# Patient Record
Sex: FEMALE | URBAN - METROPOLITAN AREA
[De-identification: names, ages, dates, MRNs, and addresses within clinical notes are randomized per-mention and may not be internally consistent; named-entity substitution may affect disease eponyms.]

---

## 2021-12-22 ENCOUNTER — OFFICE VISIT (OUTPATIENT)
Dept: ORTHOPEDICS | Facility: CLINIC | Age: 46
End: 2021-12-22

## 2021-12-22 VITALS — WEIGHT: 150 LBS | BODY MASS INDEX: 24.11 KG/M2 | HEIGHT: 66 IN

## 2021-12-22 DIAGNOSIS — M25.531 RIGHT WRIST PAIN: Primary | ICD-10-CM

## 2021-12-22 DIAGNOSIS — S62.101A CLOSED FRACTURE OF RIGHT WRIST, INITIAL ENCOUNTER: Primary | ICD-10-CM

## 2021-12-22 PROCEDURE — 99204 OFFICE O/P NEW MOD 45 MIN: CPT | Mod: 25 | Performed by: FAMILY MEDICINE

## 2021-12-22 PROCEDURE — T1013 SIGN LANG/ORAL INTERPRETER: HCPCS | Mod: U4

## 2021-12-22 PROCEDURE — 29125 APPL SHORT ARM SPLINT STATIC: CPT | Mod: RT | Performed by: FAMILY MEDICINE

## 2021-12-22 RX ORDER — HYDROCODONE BITARTRATE AND ACETAMINOPHEN 5; 325 MG/1; MG/1
1 TABLET ORAL EVERY 6 HOURS PRN
Qty: 10 TABLET | Refills: 0 | Status: SHIPPED | OUTPATIENT
Start: 2021-12-22 | End: 2021-12-25

## 2021-12-22 ASSESSMENT — MIFFLIN-ST. JEOR: SCORE: 1347.15

## 2021-12-22 NOTE — LETTER
"  12/22/2021      RE: Tonia Luo  [address]  [facility] [address]         [facility]  SPORTS & ORTHOPEDIC CLINIC VISIT     Dec 22, 2021        SUBJECTIVE  Tonia Luo is a/an 46 year old female who is seen as a self referral for evaluation of right wrist pain after a fall on Monday 12/20/21. Patient seen at  clinic at [facility] on 12/20/21 where XR was obtained.  She has significant swelling of the hand, this is painful.  She is having difficulty sleeping.  This is her dominant arm.    Onset: 2 day(s) ago. Patient describes injury as slipped on ice in work parking lot and landed on an outstretched hand.  Location of Pain: right wrist  Worsened by: pronation and supination  Better with: Rest  Treatments tried: rest/activity avoidance  Associated symptoms: swelling, tingling, burning, bruising and pain.    Orthopedic/Surgical history: NO  Social History/Occupation: cleaning and food packing.      REVIEW OF SYSTEMS:    Do you have fever, chills, weight loss? No    Do you have any vision problems? No    Do you have any chest pain or edema? No    Do you have any shortness of breath or wheezing?  No    Do you have stomach problems? No    Do you have any numbness or focal weakness? No    Do you have diabetes? No    Do you have problems with bleeding or clotting? Yes, she is on an anticoagulant.     Do you have an rashes or other skin lesions? No    OBJECTIVE:  Ht 1.676 m (5' 6\")   Wt 68 kg (150 lb)   BMI 24.21 kg/m     General  - normal appearance, in no obvious distress  Musculoskeletal -right wrist  - inspection: Moderately swollen throughout the wrist and hand, as well as fingers  - palpation: Generally tender  - ROM: Globally restricted  - strength: 5/5  strength  Neuro  - no sensory or motor deficit, grossly normal coordination, normal muscle tone        ASSESSMENT & PLAN  Ms. Annalee Luo is a 46 year old woman " here with a right wrist fracture.    I reviewed her x-rays in the room with her, these do reveal a minimally displaced distal radius fracture with mild dorsal angulation.    We did put her into a splint today, she should follow-up next week with a repeat x-ray.  At that visit we could also consider putting her into a cast, if her swelling has improved.    I did prescribe her narcotic for nighttime use, as sleeping is very difficult for her.    It was a pleasure seeing Rick Morgan DO  [facility] SPORTS MEDICINE [facility]    Cast/splint application    Date/Time: 12/22/2021 3:43 PM  Performed by: Tony Morgan DO  Authorized by: Tony Morgan DO     Consent:     Consent obtained:  Verbal    Consent given by:  Patient    Risks discussed:  Discoloration, numbness, pain and swelling    Alternatives discussed:  No treatment  Pre-procedure details:     Sensation:  Normal  Procedure details:     Laterality:  Right    Location:  Wrist    Wrist:  R wrist    Strapping: no      Splint type:  Short arm (static)    Supplies:  Fiberglass  Post-procedure details:     Pain:  Improved    Sensation:  Normal    Patient tolerance of procedure:  Tolerated well, no immediate complications    Patient provided with cast or splint care instructions: Yes        Tony Morgan DO

## 2021-12-22 NOTE — PATIENT INSTRUCTIONS
Tonia yamil were seen today for a right wrist fracture.  Given the amount of swelling that is present you are placed into a short arm splint today.  You should keep the splint on at all times, not move the wrist, it is okay to move the fingers.  You were given pain medicine that is okay to take at nighttime for sleep, as needed.  It is also okay to use the cream for your fingers.  You should follow-up in 1 week, at that visit we would do a repeat x-ray and likely put your arm into a cast, as long as you are swelling is improved.  I anticipate that this may take 4 to 6 weeks to completely heal.    It was a pleasure meeting you.    Bakari Morgan, DO CAQSM

## 2021-12-23 ENCOUNTER — APPOINTMENT (OUTPATIENT)
Dept: INTERPRETER SERVICES | Facility: CLINIC | Age: 46
End: 2021-12-23

## 2021-12-28 DIAGNOSIS — S62.101A CLOSED FRACTURE OF RIGHT WRIST, INITIAL ENCOUNTER: Primary | ICD-10-CM

## 2021-12-30 ENCOUNTER — ANCILLARY PROCEDURE (OUTPATIENT)
Dept: GENERAL RADIOLOGY | Facility: CLINIC | Age: 46
End: 2021-12-30
Attending: FAMILY MEDICINE

## 2021-12-30 ENCOUNTER — OFFICE VISIT (OUTPATIENT)
Dept: ORTHOPEDICS | Facility: CLINIC | Age: 46
End: 2021-12-30

## 2021-12-30 DIAGNOSIS — S62.101D CLOSED FRACTURE OF RIGHT WRIST WITH ROUTINE HEALING, SUBSEQUENT ENCOUNTER: Primary | ICD-10-CM

## 2021-12-30 PROCEDURE — T1013 SIGN LANG/ORAL INTERPRETER: HCPCS | Mod: U4

## 2021-12-30 PROCEDURE — 73110 X-RAY EXAM OF WRIST: CPT | Mod: RT | Performed by: RADIOLOGY

## 2021-12-30 PROCEDURE — 99213 OFFICE O/P EST LOW 20 MIN: CPT | Performed by: FAMILY MEDICINE

## 2021-12-30 NOTE — LETTER
12/30/2021      RE: Tonia Luo  [address]  [facility] [address]         [facility] CLINICS AND SURGERY CENTER  SPORTS & ORTHOPEDIC CLINIC VISIT     Dec 30, 2021        ASSESSMENT & PLAN    45 yo 10 days status post right distal radius Fracture that has been stable for past week in hospitals    Reviewed imaging and assessment with patient in detail  Given degree of comminution and dorsal angulation as well as intraarticualr nature of fx, will recommend that she follow up with hand surgery next week  Did not place cast today due to large amount of swelling still present. Recommended continued icing and elevation  If continued nonoperative treatment deemed appropriate we can cast and continue to follow her in our clinic.     Jesu Bolton MD  [facility] SPORTS MEDICINE CLINIC [place]    -----  Chief Complaint   Patient presents with     Right Wrist - Pain       SUBJECTIVE  Tonia Luo is a/an 46 year old female who is seen for follow up of right displaced distal radius fracture with mild dorsal angulation.  She feels like it is moving more during the last week. Still a significant amount of swelling.    The patient is seen with a .    Date of injury: 12/20/21  Date of Last Visit: 12/22/21 By Dr. Bakari Morgan    Symptoms: improved  Worsened by: Movement of the hand, gripping   Better with: Sling and splint   Treatment to date: ice, other medications: Hydrocodone/Acetaminophen (Vicodin/Norco) and casting/splinting/bracing  Associated symptoms: swelling        REVIEW OF SYSTEMS:    See HPI     OBJECTIVE:  There were no vitals taken for this visit.     General: Alert, pleasant, no distress  Right wrist: warm, well perfused, SILT throughout     Inspection: significant swelling over the dorsal hand with resolving ecchymosis.      ROM:not tested     Strength:intact in median, ulnar, radial nerve distribution     Palpation:ttp over distal radius. No ttp over  distal ulna. No ttp of scaphoid     Special Tests: none      RADIOLOGY:    3 view xrays of right wrist performed and reviewed independently demonstrating impacted intraarticular distal rasiud fracture with dorsal comminution and dorsal aungulation. See EMR for formal radiology report.       Jesu Bolton MD

## 2021-12-30 NOTE — PROGRESS NOTES
[facility] CLINICS AND SURGERY CENTER  SPORTS & ORTHOPEDIC CLINIC VISIT     Dec 30, 2021        ASSESSMENT & PLAN    45 yo 10 days status post right distal radius Fracture that has been stable for past week in splitn    Reviewed imaging and assessment with patient in detail  Given degree of comminution and dorsal angulation as well as intraarticualr nature of fx, will recommend that she follow up with hand surgery next week  Did not place cast today due to large amount of swelling still present. Recommended continued icing and elevation  If continued nonoperative treatment deemed appropriate we can cast and continue to follow her in our clinic.     Jesu Bolton MD  [facility] SPORTS MEDICINE [facility]    -----  Chief Complaint   Patient presents with     Right Wrist - Pain       SUBJECTIVE  Tonia Luo is a/an 46 year old female who is seen for follow up of right displaced distal radius fracture with mild dorsal angulation.  She feels like it is moving more during the last week. Still a significant amount of swelling.    The patient is seen with a .    Date of injury: 12/20/21  Date of Last Visit: 12/22/21 By Dr. Bakari Morgan    Symptoms: improved  Worsened by: Movement of the hand, gripping   Better with: Sling and splint   Treatment to date: ice, other medications: Hydrocodone/Acetaminophen (Vicodin/Norco) and casting/splinting/bracing  Associated symptoms: swelling        REVIEW OF SYSTEMS:    See HPI     OBJECTIVE:  There were no vitals taken for this visit.     General: Alert, pleasant, no distress  Right wrist: warm, well perfused, SILT throughout     Inspection: significant swelling over the dorsal hand with resolving ecchymosis.      ROM:not tested     Strength:intact in median, ulnar, radial nerve distribution     Palpation:ttp over distal radius. No ttp over distal ulna. No ttp of scaphoid     Special Tests: none      RADIOLOGY:    3 view xrays of right  wrist performed and reviewed independently demonstrating impacted intraarticular distal rasiud fracture with dorsal comminution and dorsal aungulation. See EMR for formal radiology report.

## 2022-01-03 ENCOUNTER — LAB (OUTPATIENT)
Dept: LAB | Facility: CLINIC | Age: 47
End: 2022-01-03

## 2022-01-03 ENCOUNTER — OFFICE VISIT (OUTPATIENT)
Dept: ORTHOPEDICS | Facility: CLINIC | Age: 47
End: 2022-01-03

## 2022-01-03 ENCOUNTER — ANCILLARY PROCEDURE (OUTPATIENT)
Dept: GENERAL RADIOLOGY | Facility: CLINIC | Age: 47
End: 2022-01-03
Attending: STUDENT IN AN ORGANIZED HEALTH CARE EDUCATION/TRAINING PROGRAM

## 2022-01-03 VITALS — HEIGHT: 64 IN | WEIGHT: 180 LBS | BODY MASS INDEX: 30.73 KG/M2

## 2022-01-03 DIAGNOSIS — S52.571A OTHER CLOSED INTRA-ARTICULAR FRACTURE OF DISTAL END OF RIGHT RADIUS, INITIAL ENCOUNTER: ICD-10-CM

## 2022-01-03 DIAGNOSIS — S52.501A CLOSED FRACTURE OF RIGHT DISTAL RADIUS: Primary | ICD-10-CM

## 2022-01-03 DIAGNOSIS — S52.501A CLOSED FRACTURE OF RIGHT DISTAL RADIUS: ICD-10-CM

## 2022-01-03 DIAGNOSIS — Z48.89 ENCOUNTER FOR POST SURGICAL WOUND CHECK: Primary | ICD-10-CM

## 2022-01-03 DIAGNOSIS — S52.509A DISTAL RADIUS FRACTURE: ICD-10-CM

## 2022-01-03 PROCEDURE — 99000 SPECIMEN HANDLING OFFICE-LAB: CPT | Performed by: PATHOLOGY

## 2022-01-03 PROCEDURE — 99204 OFFICE O/P NEW MOD 45 MIN: CPT | Performed by: STUDENT IN AN ORGANIZED HEALTH CARE EDUCATION/TRAINING PROGRAM

## 2022-01-03 PROCEDURE — U0003 INFECTIOUS AGENT DETECTION BY NUCLEIC ACID (DNA OR RNA); SEVERE ACUTE RESPIRATORY SYNDROME CORONAVIRUS 2 (SARS-COV-2) (CORONAVIRUS DISEASE [COVID-19]), AMPLIFIED PROBE TECHNIQUE, MAKING USE OF HIGH THROUGHPUT TECHNOLOGIES AS DESCRIBED BY CMS-2020-01-R: HCPCS | Mod: 90 | Performed by: PATHOLOGY

## 2022-01-03 PROCEDURE — 73110 X-RAY EXAM OF WRIST: CPT | Mod: RT | Performed by: RADIOLOGY

## 2022-01-03 PROCEDURE — U0005 INFEC AGEN DETEC AMPLI PROBE: HCPCS | Mod: 90 | Performed by: PATHOLOGY

## 2022-01-03 ASSESSMENT — MIFFLIN-ST. JEOR: SCORE: 1451.47

## 2022-01-03 NOTE — PHARMACY - PREOPERATIVE ASSESSMENT CENTER
Anticoagulation Note - Preoperative Assessment Center (PAC) Pharmacist     Patient was interviewed on January 3, 2022 as a part of PAC clinic appointment. The purpose of this note is to document the perioperative anticoagulation plan outlined by the providers caring for Tonia Luo.     Current Regimen  Anticoagulation Regimen as of January 3, 2022: enoxaparin (Lovenox) 80 mg subcutaneous q12 hours  Indication: h/o DVT/PE in 2018  Prescriber:  Unknown - tried to call pharmacy that patient said she filled it at but no record of them filling it.    Expected Duration of therapy: unknown - no records on file that we can review.   Current medications that may interact with this include: none - no other medications.   Renal function: No labs     Perioperative plan  Tonia Luo is scheduled for OPEN REDUCTION INTERNAL FIXATION, FRACTURE, WRIST on 1/6/22 with Dr. Lambert and the perioperative anticoagulation plan outlined by PAC staff is to hold lovenox x24 hr pre op (last dose 1/5/22 AM).  Plan to check INR AM of surgery as well as  used the word 'Coumadin' during interview with PAC EDWIGE, though patient later denied taking any coumadin and just on the lovnox 2 shots per day.  States these are prescribed through the [facility] (unable to find contact information for this clinic).        Resumption of anticoagulation after procedure will be based on surgery team assessment of bleeding risks and complications.  This plan may require re-assessment and modification by her primary team in the perioperative setting depending on patients clinical situation.    Jeffery Jennings RPH  January 3, 2022  2:57 PM

## 2022-01-03 NOTE — TELEPHONE ENCOUNTER
FUTURE VISIT INFORMATION      SURGERY INFORMATION:    Pre-op H&P,  needed- Surgery 1-6-22, Dr Piyush Lambert, ORIF Rt distal radius fx    Consult: ov 1/3    RECORDS REQUESTED FROM:       Primary Care Provider: Madeleine Vogel MD- [facility]

## 2022-01-03 NOTE — PROGRESS NOTES
"Orthopaedic Surgery Hand and Upper Extremity Clinic H&P NOTE:  1/3/2022     Patient Name: Tonia Luo  MRN: [id number]    CHIEF COMPLAINT: Right distal radius fx    Dominant Hand: Right  Occupation: House cleaning services      HPI:  Ms. Tonia Luo is a 46 year old female right hand dominant who was referred to me by my colleague Dr. Bolton for a consult.  The patient sustained a right distal radius fracture 2 weeks ago when she sustained a mechanical ground-level fall onto her right wrist after slipping on ice.  She was seen in urgent care, no reduction was attempted.  She has been in a splint since that time.  Her pain is now better controlled.  She has no tenderness or tingling in the fingers.  Her fingers and hand remain somewhat swollen. She does not smoke.      PAST MEDICAL HISTORY:  No past medical history on file.    PAST SURGICAL HISTORY:  No past surgical history on file.    MEDICATIONS:  Current Outpatient Medications   Medication     enoxaparin ANTICOAGULANT (LOVENOX) 80 MG/0.8ML syringe     No current facility-administered medications for this visit.       ALLERGIES:   No Known Allergies    FAMILY HISTORY:  No pertinent family history    SOCIAL HISTORY:  Social History     Tobacco Use     Smoking status: Not on file     Smokeless tobacco: Not on file   Substance Use Topics     Alcohol use: Not on file     Drug use: Not on file             The patient's past medical, family, and social history was reviewed and confirmed.    REVIEW OF SYMPTOMS:      General: Negative   Eyes: Negative   Ear, Nose and Throat: Negative   Respiratory: Negative   Cardiovascular: Negative   Gastrointestinal: Negative   Genito-urinary: Negative   Musculoskeletal: Negative  Neurological: Negative   Psychological: Negative  HEME: Negative   ENDO: Negative   SKIN: Negative    VITALS:  Vitals:    01/03/22 1013   Weight: 81.6 kg (180 lb)   Height: 1.626 m (5' 4\")       EXAM:  General: NAD, A&Ox3  HEENT: " NC/AT  CV: RRR by peripheral pulse  Pulmonary: Non-labored breathing on RA  RUE:   Splint removed, skin intact, mild yellow staining of the fingers and hands from a cream that she has been using for pain control  Appropriate tender to palpation at the distal radius  Range of motion deferred  Intact EPL, FPL, hand intrinsics  Sensation intact to light touch median, radial, ulnar nerves  Warm well-perfused, capillary refill less than 2 seconds      IMAGING:  X-rays of the right wrist obtained today reviewed by me demonstrates an intra-articular right distal radius fracture with 25 degrees of dorsal angulation and dorsal comminution. Joint congruity relatively well preserved.    I have personally reviewed the above images and labs.         IMPRESSION AND RECOMMENDATIONS:  Ms. Tonia Luo is a 46 year old year old female right hand dominant with right distal radius fracture.    Visit was conducted with the help of a [place]uran telephone .    Given her young age and functional status and the current dorsal angulation, I have recommended surgery.  I offered her closed reduction and percutaneous pinning versus open reduction internal fixation.  Risks benefits of both were explained in detail with the patient.  The patient would like to get moving as quickly as possible and thus elected to proceed with open reduction internal fixation.    The patient understands that the risks of surgery include, but are not limited to: infection, bleeding, injury to nearby structures (such as nerves, blood vessels, and tendons), nonunion, malunion, hardware failure/irritation or breakage, need for additional surgery, pain, stiffness, scarring, need for rehabilitation, blood clots, pulmonary embolism, stroke, arrhythmia, myocardial infarction, death, and anesthetic complications.  Patient expressed understanding and elected to proceed with surgery. All questions were answered to her satisfaction.    We will plan for  surgery on Thursday, January 6 as she is already 2 weeks out.    Piyush Lambert MD    Hand, Upper Extremity & Microvascular Surgery  Department of Orthopaedic Surgery  [facility]

## 2022-01-03 NOTE — NURSING NOTE
Swedish speaking only.  Patient is alone, used language services with I pad for interpreting/teaching.    Patient spouse can read English and help her with the folder at home also.  Swedish handout provided along with the normal folder.   assisted in explaining each page.    Teaching Flowsheet   Relevant Diagnosis: Right distal radius fracture  Teaching Topic: ORIF Right distal radius.  Urgent surgery needed.  MD would recommend doing surgery this week on 1-6-22.     Patient does not have insurance.  Financial Counseling informed.    [facility] under MAC with [facility] anesthesia with Dr Piyush Lambert.    PAC visit scheduled for tomorrow at 7:30 AM (video visit) and Covid test obtained today after clinic appointment in [facility] lab.    Person(s) involved in teaching:   Patient and .    Motivation Level:  Asks Questions: Yes  Eager to Learn: Yes  Cooperative: Yes  Receptive (willing/able to accept information): Yes  Any cultural factors/Jew beliefs that may influence understanding or compliance? No    Patient demonstrates understanding of the following:  Reason for the appointment, diagnosis and treatment plan: Yes  Knowledge of proper use of medications and conditions for which they are ordered (with special attention to potential side effects or drug interactions): Yes  Which situations necessitate calling provider and whom to contact: Yes    Teaching Concerns Addressed:   Proper use and care of  (medical equip, care aids, etc.): Yes  Nutritional needs and diet plan: Yes  Pain management techniques: Yes  Wound Care: Yes  How and/when to access community resources: Yes     Instructional Materials Used/Given:   Preoperative teaching packet, antibacterial soap.  Frieda Matthews RN

## 2022-01-03 NOTE — LETTER
1/3/2022         RE: Tonia Luo  [address]  [facility] [address]        Dear Colleague,    Thank you for referring your patient, Tonia Luo, to the [facility] [place]. Please see a copy of my visit note below.    Orthopaedic Surgery Hand and Upper Extremity Clinic H&P NOTE:  1/3/2022     Patient Name: Tonia Luo  MRN: [id number]    CHIEF COMPLAINT: Right distal radius fx    Dominant Hand: Right  Occupation: House cleaning services      HPI:  Ms. Tonia Luo is a 46 year old female right hand dominant who was referred to me by my colleague Dr. Bolton for a consult.  The patient sustained a right distal radius fracture 2 weeks ago when she sustained a mechanical ground-level fall onto her right wrist after slipping on ice.  She was seen in urgent care, no reduction was attempted.  She has been in a splint since that time.  Her pain is now better controlled.  She has no tenderness or tingling in the fingers.  Her fingers and hand remain somewhat swollen. She does not smoke.      PAST MEDICAL HISTORY:  No past medical history on file.    PAST SURGICAL HISTORY:  No past surgical history on file.    MEDICATIONS:  Current Outpatient Medications   Medication     enoxaparin ANTICOAGULANT (LOVENOX) 80 MG/0.8ML syringe     No current facility-administered medications for this visit.       ALLERGIES:   No Known Allergies    FAMILY HISTORY:  No pertinent family history    SOCIAL HISTORY:  Social History     Tobacco Use     Smoking status: Not on file     Smokeless tobacco: Not on file   Substance Use Topics     Alcohol use: Not on file     Drug use: Not on file             The patient's past medical, family, and social history was reviewed and confirmed.    REVIEW OF SYMPTOMS:      General: Negative   Eyes: Negative   Ear, Nose and Throat: Negative   Respiratory: Negative   Cardiovascular: Negative   Gastrointestinal:  "Negative   Genito-urinary: Negative   Musculoskeletal: Negative  Neurological: Negative   Psychological: Negative  HEME: Negative   ENDO: Negative   SKIN: Negative    VITALS:  Vitals:    01/03/22 1013   Weight: 81.6 kg (180 lb)   Height: 1.626 m (5' 4\")       EXAM:  General: NAD, A&Ox3  HEENT: NC/AT  CV: RRR by peripheral pulse  Pulmonary: Non-labored breathing on RA  RUE:   Splint removed, skin intact, mild yellow staining of the fingers and hands from a cream that she has been using for pain control  Appropriate tender to palpation at the distal radius  Range of motion deferred  Intact EPL, FPL, hand intrinsics  Sensation intact to light touch median, radial, ulnar nerves  Warm well-perfused, capillary refill less than 2 seconds      IMAGING:  X-rays of the right wrist obtained today reviewed by me demonstrates an intra-articular right distal radius fracture with 25 degrees of dorsal angulation and dorsal comminution. Joint congruity relatively well preserved.    I have personally reviewed the above images and labs.         IMPRESSION AND RECOMMENDATIONS:  Ms. Tonia Luo is a 46 year old year old female right hand dominant with right distal radius fracture.    Visit was conducted with the help of a Namibian telephone .    Given her young age and functional status and the current dorsal angulation, I have recommended surgery.  I offered her closed reduction and percutaneous pinning versus open reduction internal fixation.  Risks benefits of both were explained in detail with the patient.  The patient would like to get moving as quickly as possible and thus elected to proceed with open reduction internal fixation.    The patient understands that the risks of surgery include, but are not limited to: infection, bleeding, injury to nearby structures (such as nerves, blood vessels, and tendons), nonunion, malunion, hardware failure/irritation or breakage, need for additional surgery, pain, " stiffness, scarring, need for rehabilitation, blood clots, pulmonary embolism, stroke, arrhythmia, myocardial infarction, death, and anesthetic complications.  Patient expressed understanding and elected to proceed with surgery. All questions were answered to her satisfaction.    We will plan for surgery on Thursday, January 6 as she is already 2 weeks out.    Piyush Lambert MD    Hand, Upper Extremity & Microvascular Surgery  Department of Orthopaedic Surgery  [facility]

## 2022-01-03 NOTE — PROGRESS NOTES
Preoperative Assessment Center Medication History Note    Medication history completed on January 3, 2022 by this writer. See Epic admission navigator for prior to admission medications. Operating room staff will still need to confirm medications and last dose information on day of surgery.     Medication history interview sources  Patient interview: Yes  Care Everywhere records: No  Surescripts pharmacy refill records: No  Other (if applicable): utilized .     Changes made to PTA medication list  Added: none  Deleted: none   Changed: sig on lovenox    Additional medication history information (including reliability of information, actions taken by pharmacist):    -- Patient states she fills her lovenox at the [address] [place] [address] pharmacy, I called pharmacy and no record of that on file.  Unclear where she is filling the lovenox, but insists she is taking it and has been for the past 3 years since a PE event in 2018.  WE have little to no record of anything in our system or care everywere to decipher whether this is correct, but patient read the dose directly to me and stated it was enoxaparin or lovenox and she uses it twice daily and this was the only medication she was taking  -- No recent (within 30 days) course of antibiotics  -- Patient declines being on any other prescription or over-the-counter medications    Prior to Admission medications    Medication Sig Last Dose Taking? Auth Provider   enoxaparin ANTICOAGULANT (LOVENOX) 80 MG/0.8ML syringe Inject 80 mg Subcutaneous every 12 hours  Taking Yes Reported, Patient          Medication history completed by: Jeffery Jennings [facility]

## 2022-01-03 NOTE — PROGRESS NOTES
Reason For Visit:   Chief Complaint   Patient presents with     Consult     Right wrist injury 12/19/21       Primary MD: Madeleine Vogel      ?  Yes, specify language: Macedonian    Age: 46 year old    Occupation N/A.  Currently working? No.  Work status?  ***.  Date of injury: 12/19/21  Type of injury: ***.  Date of surgery: ***  Type of surgery: ***.  Smoker: No  Request smoking cessation information: No      There were no vitals taken for this visit.      Pain Assessment  Patient Currently in Pain: Yes  0-10 Pain Scale: 2               QuickDASH Assessment  No flowsheet data found.       No Known Allergies    Aissatou Lopes, ATC

## 2022-01-04 ENCOUNTER — PRE VISIT (OUTPATIENT)
Dept: SURGERY | Facility: CLINIC | Age: 47
End: 2022-01-04

## 2022-01-04 ENCOUNTER — VIRTUAL VISIT (OUTPATIENT)
Dept: SURGERY | Facility: CLINIC | Age: 47
End: 2022-01-04

## 2022-01-04 ENCOUNTER — ANESTHESIA EVENT (OUTPATIENT)
Dept: SURGERY | Facility: AMBULATORY SURGERY CENTER | Age: 47
End: 2022-01-04

## 2022-01-04 DIAGNOSIS — Z01.818 PREOP EXAMINATION: Primary | ICD-10-CM

## 2022-01-04 LAB — SARS-COV-2 RNA RESP QL NAA+PROBE: NEGATIVE

## 2022-01-04 PROCEDURE — 99203 OFFICE O/P NEW LOW 30 MIN: CPT | Mod: 95 | Performed by: PHYSICIAN ASSISTANT

## 2022-01-04 ASSESSMENT — PAIN SCALES - GENERAL: PAINLEVEL: NO PAIN (0)

## 2022-01-04 ASSESSMENT — LIFESTYLE VARIABLES: TOBACCO_USE: 0

## 2022-01-04 ASSESSMENT — ENCOUNTER SYMPTOMS: SEIZURES: 0

## 2022-01-04 NOTE — PATIENT INSTRUCTIONS
Preparing for Your Surgery      Name:  Tonia Luo   MRN:  [id number]   :  1975   Today's Date:  2022         Arriving for surgery:  Surgery date:  22  Arrival time:  No surgery time scheduled at the time. You will receive a call from Pre-Admissions with arrival time.    Restrictions due to COVID 19:  One consistent visitor is allowed per patient  No ill visitors  All visitors must wear face mask     parking is available for anyone with mobility limitations or disabilities. (Monday- Friday 7 am- 5 pm)    Please come to:    [facility]  [address] [place] [id number]    Please check in on the 5th floor at the Ambulatory Surgery Center       What can I eat or drink? Pre-Admissions will give you exact times.    -  You may eat and drink normally until 8 hours before surgery.  -  You may have clear liquids up to 4 hours before surgery.   Examples of clear liquids:  Water  Clear broth  Juices (apple, white grape, white cranberry  and cider) without pulp  Noncarbonated, powder based beverages  (lemonade and Calvin-Aid)  Sodas (Sprite, 7-Up, ginger ale and seltzer)  Coffee or tea (without milk or cream)  Gatorade    --No alcohol for at least 24 hours before surgery    Which medicines can I take?    Hold Aspirin for 7 days before surgery.   Hold Multivitamins for 7 days before surgery.  Hold Supplements for 7 days before surgery.  Hold Ibuprofen (Advil, Motrin) for 1 day before surgery--unless otherwise directed by surgeon.  Hold Naproxen (Aleve) for 4 days before surgery.    Hold Enoxaparin (Lovenox) for 12 hours prior to surgery. Hold the morning Lovenox dose the morning of surgery.      -  PLEASE TAKE the following medications the day of surgery   Acetaminophen (Tylenol) if needed    How do I prepare myself?  - Please take 2 showers before surgery using Scrubcare or Hibiclens soap.    Use this soap only from the neck to your toes.     Leave the soap  on your skin for one minute--then rinse thoroughly.      You may use your own shampoo and conditioner; no other hair products.   - Please remove all jewelry and body piercings.  - No lotions, deodorants or fragrance.  - No makeup or fingernail polish.   - Bring your ID and insurance card.    -If you have a Deep Brain Stimulator, a Spinal Cord Stimulator or any implanted Neuro Device you must bring the remote to the Surgery Center         - All patients are required to have a Covid-19 test within 4 days of surgery/procedure.      -Patients will be contacted by the [facility] scheduling team within 1 week of surgery to make an appointment.      - Patients may call the Scheduling team at [phone] if they have not been scheduled within 4 days of  surgery.      ALL PATIENTS ARE REQUIRED TO HAVE A RESPONSIBLE ADULT TO DRIVE AND BE IN ATTENDANCE WITH THEM FOR 24 HOURS FOLLOWING SURGERY       Questions or Concerns:    -For questions regarding the day of surgery please contact the Ambulatory Surgery Center at [phone].    -If you have health changes between today and your surgery please contact your surgeon.     For questions after surgery please call your surgeons office.

## 2022-01-04 NOTE — H&P
Pre-Operative H & P     CC:  Preoperative exam to assess for increased cardiopulmonary risk while undergoing surgery and anesthesia.    Date of Encounter: 1/4/2022  Primary Care Physician:  Madeleine Vogel     Reason for Visit: Distal radius fracture    Phone-Visit Details    Type of service:  Phone Visit    Patient verbally consented to video service today: YES    Phone Start Time: 0728  Phone End Time (time video stopped): 0745    Originating Location (pt. Location): Home    Distant Location (provider location):  [facility] PREOPERATIVE ASSESSMENT [facility]     Mode of Communication:  Video Conference via Doximity      HPI  Tonia Luo is a 47 y/o female who presents for pre-operative H&P in preparation for OPEN REDUCTION INTERNAL FIXATION, FRACTURE, WRIST with Piyush Lambert MD on 1/6/22 at [facility] and Surgery Center for treatment of Distal radius fracture.     Ms. Annalee Luo recently sustained a right distal radius fracture 2 weeks ago when she sustained a mechanical ground-level fall onto her right wrist after slipping on ice.  She was seen in urgent care, no reduction was attempted.  She has been in a splint since that time.  She now presents for the above procedure.    PMH is also significant for hx DVT/PE in 2018 and obesity.    History was obtained from patient via  & chart review.       Hx of abnormal bleeding or anti-platelet use: on lovenox    Menstrual history: No LMP recorded. Patient is premenopausal.:      Prior to Admission Medications  Current Outpatient Medications   Medication Sig Dispense Refill     enoxaparin ANTICOAGULANT (LOVENOX) 80 MG/0.8ML syringe Inject 80 mg Subcutaneous every 12 hours          Family History  Family History   Problem Relation Age of Onset     Anesthesia Reaction No family hx of      Cardiovascular No family hx of      Deep Vein Thrombosis (DVT) No family hx of        The complete review of systems is negative other than  noted in the HPI or here.       Anesthesia Pre-Procedure Evaluation    Patient: Tonia Luo   MRN: [id number] : 1975        Preoperative Diagnosis: Distal radius fracture [S52.509A]    Procedure : Procedure(s):  OPEN REDUCTION INTERNAL FIXATION, FRACTURE, WRIST  Video Visit       No past medical history on file.   Past Surgical History:   Procedure Laterality Date     ABDOMINAL WALL SURGERY  2018    tummy tuck      No Known Allergies   Social History     Tobacco Use     Smoking status: Never Smoker     Smokeless tobacco: Never Used   Substance Use Topics     Alcohol use: Not on file      Wt Readings from Last 1 Encounters:   22 81.6 kg (180 lb)            ROS/MED HX  The complete review of systems is negative other than noted in the HPI or here.  Patient denies recent illness, fever and respiratory infection during past month.  Pt denies steroid use during past year.    ENT/Pulmonary:  - neg pulmonary ROS  (-) tobacco use, asthma and sleep apnea   Neurologic:  - neg neurologic ROS  (-) no seizures and no CVA   Cardiovascular:    Neg cardiovasular   METS/Exercise Tolerance: >4 METS    Hematologic: Comments: Hx PE, on lovenox (prescribed at [facility])          (+) History of blood clots, pt is anticoagulated,  (-) history of blood transfusion   Musculoskeletal: Comment: Acute right distal radius fracture        GI/Hepatic:  - neg GI/hepatic ROS  (-) GERD and liver disease   Renal/Genitourinary:  - neg Renal ROS  (-) renal disease   Endo:     (+) Obesity,  (-) Type II DM   Psychiatric/Substance Use:  - neg psychiatric ROS     Infectious Disease:  - neg infectious disease ROS     Malignancy:  - neg malignancy ROS     Other:  - neg other ROS              PAC Discussion and Assessment    ASA Classification: 2  Case is suitable for: ASC        Virtual visit -  No vitals were obtained       Physical Exam  Constitutional: Awake, alert, cooperative, no apparent distress, and appears stated  age.  Neurologic: Awake, alert, oriented to name, place and time.   Neuropsychiatric: Calm, cooperative. Normal affect. Answers questions appropriately.    ** Patient's visit was done virtually today.  A full physical exam was not completed.  Please refer to the physical examination documented by the anesthesiologist in the anesthesia record on the day of surgery. **        PRIOR LABS/DIAGNOSTIC STUDIES:   All labs and imaging personally reviewed     3 views right wrist radiographs 1/3/2022  Impression:  Redemonstrated dorsally angulated comminuted fracture of the right  distal radius, overall similar in appearance and alignment to prior.  Splinting material place.    The patient's records and results personally reviewed by this provider.       LABS/DIAGNOSTIC STUDIES to be collected on DOS:  INR, BMP, Hgb      COVID19 testing completed 1/3/22 - result pending      ASSESSMENT and PLAN  Tonia Luo is a 45 y/o female who presents for pre-operative H&P in preparation for OPEN REDUCTION INTERNAL FIXATION, FRACTURE, WRIST with Piyush Lambret MD on 1/6/22 at [facility] and Surgery Center for treatment of Distal radius fracture    Pt has had prior anesthetic.  No history of anesthetic complications.      She has the following specific operative considerations:   # DAYLIN 1/8 = low risk  # VTE risk: 1.8%  # Risk of PONV score = 3.  If > 2, anti-emetic intervention recommended.  # Anesthesia considerations:  Refer to PAC assessment in anesthesia records    # Increased risk of postoperative nausea/vomiting: Recommend use of antiemetic agents in the perioperative period.        CARDIAC: METS >4      # RCRI : No serious cardiac risks.  0.4% risk of major adverse cardiac event.       PULMONARY:     # Never smoked    # Denies asthma or inhaler use    GI:     # Denies GERD    ENDO: BMI 31    # No DM    HEME:     # On lovenox bid, will hold 12 hrs prior (prescribed at [facility] per pt). Will check INR on DOS.    #  Hx PE one year ago, per pt    ORTHO:     # Acute right distal radius fracture        Patient is optimized and is acceptable candidate for the proposed procedure. No further diagnostic evaluation is needed.      ** Patient's visit was done virtually today.  A full physical exam was not completed.  Please refer to the physical examination documented by the anesthesiologist in the anesthesia record on the day of surgery. **    Final plan per anesthesiologist on day of surgery.     Arrival time, NPO, shower and medication instructions provided by nursing staff today.  Preparing For Your Surgery handout given.        On the day of service:     Prep time: 12 minutes  Visit time: 17 minutes  Documentation time: 10 minutes  ------------------------------------------  Total time: 39 minutes      Rita Pelletier PA-C  Preoperative Assessment Center  [facility]  Clinic and Surgery Center  Phone: [phone]  Fax: [phone]

## 2022-01-04 NOTE — H&P (VIEW-ONLY)
Pre-Operative H & P     CC:  Preoperative exam to assess for increased cardiopulmonary risk while undergoing surgery and anesthesia.    Date of Encounter: 1/4/2022  Primary Care Physician:  Madeleine Vogel     Reason for Visit: Distal radius fracture    Phone-Visit Details    Type of service:  Phone Visit    Patient verbally consented to video service today: YES    Phone Start Time: 0728  Phone End Time (time video stopped): 0745    Originating Location (pt. Location): Home    Distant Location (provider location):  [facility] PREOPERATIVE ASSESSMENT CENTER     Mode of Communication:  Video Conference via Doximity      HPI  Tonia Luo is a 45 y/o female who presents for pre-operative H&P in preparation for OPEN REDUCTION INTERNAL FIXATION, FRACTURE, WRIST with Piyush Lambert MD on 1/6/22 at [facility] and Surgery Center for treatment of Distal radius fracture.     Ms. Annalee Luo recently sustained a right distal radius fracture 2 weeks ago when she sustained a mechanical ground-level fall onto her right wrist after slipping on ice.  She was seen in urgent care, no reduction was attempted.  She has been in a splint since that time.  She now presents for the above procedure.    PMH is also significant for hx DVT/PE in 2018 and obesity.    History was obtained from patient via  & chart review.       Hx of abnormal bleeding or anti-platelet use: on lovenox    Menstrual history: No LMP recorded. Patient is premenopausal.:      Prior to Admission Medications  Current Outpatient Medications   Medication Sig Dispense Refill     enoxaparin ANTICOAGULANT (LOVENOX) 80 MG/0.8ML syringe Inject 80 mg Subcutaneous every 12 hours          Family History  Family History   Problem Relation Age of Onset     Anesthesia Reaction No family hx of      Cardiovascular No family hx of      Deep Vein Thrombosis (DVT) No family hx of        The complete review of systems is negative other than  noted in the HPI or here.       Anesthesia Pre-Procedure Evaluation    Patient: Tonia Luo   MRN: [id number] : 1975        Preoperative Diagnosis: Distal radius fracture [S52.509A]    Procedure : Procedure(s):  OPEN REDUCTION INTERNAL FIXATION, FRACTURE, WRIST  Video Visit       No past medical history on file.   Past Surgical History:   Procedure Laterality Date     ABDOMINAL WALL SURGERY  2018    tummy tuck      No Known Allergies   Social History     Tobacco Use     Smoking status: Never Smoker     Smokeless tobacco: Never Used   Substance Use Topics     Alcohol use: Not on file      Wt Readings from Last 1 Encounters:   22 81.6 kg (180 lb)            ROS/MED HX  The complete review of systems is negative other than noted in the HPI or here.  Patient denies recent illness, fever and respiratory infection during past month.  Pt denies steroid use during past year.    ENT/Pulmonary:  - neg pulmonary ROS  (-) tobacco use, asthma and sleep apnea   Neurologic:  - neg neurologic ROS  (-) no seizures and no CVA   Cardiovascular:    Neg cardiovasular   METS/Exercise Tolerance: >4 METS    Hematologic: Comments: Hx PE, on lovenox (prescribed at [facility])          (+) History of blood clots, pt is anticoagulated,  (-) history of blood transfusion   Musculoskeletal: Comment: Acute right distal radius fracture        GI/Hepatic:  - neg GI/hepatic ROS  (-) GERD and liver disease   Renal/Genitourinary:  - neg Renal ROS  (-) renal disease   Endo:     (+) Obesity,  (-) Type II DM   Psychiatric/Substance Use:  - neg psychiatric ROS     Infectious Disease:  - neg infectious disease ROS     Malignancy:  - neg malignancy ROS     Other:  - neg other ROS              PAC Discussion and Assessment    ASA Classification: 2  Case is suitable for: ASC        Virtual visit -  No vitals were obtained       Physical Exam  Constitutional: Awake, alert, cooperative, no apparent distress, and appears stated  age.  Neurologic: Awake, alert, oriented to name, place and time.   Neuropsychiatric: Calm, cooperative. Normal affect. Answers questions appropriately.    ** Patient's visit was done virtually today.  A full physical exam was not completed.  Please refer to the physical examination documented by the anesthesiologist in the anesthesia record on the day of surgery. **        PRIOR LABS/DIAGNOSTIC STUDIES:   All labs and imaging personally reviewed     3 views right wrist radiographs 1/3/2022  Impression:  Redemonstrated dorsally angulated comminuted fracture of the right  distal radius, overall similar in appearance and alignment to prior.  Splinting material place.    The patient's records and results personally reviewed by this provider.       LABS/DIAGNOSTIC STUDIES to be collected on DOS:  INR, BMP, Hgb      COVID19 testing completed 1/3/22 - result pending      ASSESSMENT and PLAN  Tonia Luo is a 47 y/o female who presents for pre-operative H&P in preparation for OPEN REDUCTION INTERNAL FIXATION, FRACTURE, WRIST with Piyush Lambert MD on 1/6/22 at [facility] and Surgery Center for treatment of Distal radius fracture    Pt has had prior anesthetic.  No history of anesthetic complications.      She has the following specific operative considerations:   # DAYLIN 1/8 = low risk  # VTE risk: 1.8%  # Risk of PONV score = 3.  If > 2, anti-emetic intervention recommended.  # Anesthesia considerations:  Refer to PAC assessment in anesthesia records    # Increased risk of postoperative nausea/vomiting: Recommend use of antiemetic agents in the perioperative period.        CARDIAC: METS >4      # RCRI : No serious cardiac risks.  0.4% risk of major adverse cardiac event.       PULMONARY:     # Never smoked    # Denies asthma or inhaler use    GI:     # Denies GERD    ENDO: BMI 31    # No DM    HEME:     # On lovenox bid, will hold 12 hrs prior (prescribed at [facility] per pt). Will check INR on DOS.    #  Hx PE one year ago, per pt    ORTHO:     # Acute right distal radius fracture        Patient is optimized and is acceptable candidate for the proposed procedure. No further diagnostic evaluation is needed.      ** Patient's visit was done virtually today.  A full physical exam was not completed.  Please refer to the physical examination documented by the anesthesiologist in the anesthesia record on the day of surgery. **    Final plan per anesthesiologist on day of surgery.     Arrival time, NPO, shower and medication instructions provided by nursing staff today.  Preparing For Your Surgery handout given.        On the day of service:     Prep time: 12 minutes  Visit time: 17 minutes  Documentation time: 10 minutes  ------------------------------------------  Total time: 39 minutes      Rita Pelletier PA-C  Preoperative Assessment Center  [facility]  Clinic and Surgery Center  Phone: [phone]  Fax: [phone]

## 2022-01-04 NOTE — PROGRESS NOTES
Tonia is a 46 year old who is being evaluated via a billable video visit.      How would you like to obtain your AVS? Mail a copy E-mail to [email or web address]  If the video visit is dropped, the invitation should be resent by: Text to cell phone: [phone]     HPI       Review of Systems         Physical Exam

## 2022-01-04 NOTE — ANESTHESIA PREPROCEDURE EVALUATION
Anesthesia Pre-Procedure Evaluation    Patient: Tonia Luo   MRN: [id number] : 1975        Preoperative Diagnosis: Distal radius fracture [S52.509A]    Procedure : Procedure(s):  OPEN REDUCTION INTERNAL FIXATION, FRACTURE, WRIST  Video Visit       No past medical history on file.   Past Surgical History:   Procedure Laterality Date     ABDOMINAL WALL SURGERY      tummy tuck      No Known Allergies   Social History     Tobacco Use     Smoking status: Never Smoker     Smokeless tobacco: Never Used   Substance Use Topics     Alcohol use: Not on file      Wt Readings from Last 1 Encounters:   22 81.6 kg (180 lb)        Anesthesia Evaluation   Pt has had prior anesthetic.     No history of anesthetic complications       ROS/MED HX  ENT/Pulmonary:  - neg pulmonary ROS  (-) tobacco use, asthma and sleep apnea   Neurologic:  - neg neurologic ROS  (-) no seizures and no CVA   Cardiovascular:       METS/Exercise Tolerance: >4 METS    Hematologic: Comments: Hx PE, on lovenox (prescribed at [facility])          (+) History of blood clots, pt is anticoagulated,  (-) history of blood transfusion   Musculoskeletal: Comment: Acute right distal radius fracture        GI/Hepatic:  - neg GI/hepatic ROS  (-) GERD and liver disease   Renal/Genitourinary:  - neg Renal ROS  (-) renal disease   Endo:     (+) Obesity,  (-) Type II DM   Psychiatric/Substance Use:  - neg psychiatric ROS     Infectious Disease:  - neg infectious disease ROS     Malignancy:  - neg malignancy ROS     Other:  - neg other ROS          Physical Exam    Airway  airway exam normal           Respiratory Devices and Support         Dental  no notable dental history         Cardiovascular   cardiovascular exam normal          Pulmonary   pulmonary exam normal                OUTSIDE LABS:  CBC: No results found for: WBC, HGB, HCT, PLT  BMP: No results found for: NA, POTASSIUM, CHLORIDE, CO2, BUN, CR, GLC  COAGS: No results found  for: PTT, INR, FIBR  POC: No results found for: BGM, HCG, HCGS  HEPATIC: No results found for: ALBUMIN, PROTTOTAL, ALT, AST, GGT, ALKPHOS, BILITOTAL, BILIDIRECT, FROYLAN  OTHER: No results found for: PH, LACT, A1C, HANNA, PHOS, MAG, LIPASE, AMYLASE, TSH, T4, T3, CRP, SED    Anesthesia Plan    ASA Status:  2   NPO Status:  NPO Appropriate    Anesthesia Type: MAC.     - Reason for MAC: straight local not clinically adequate   Induction: Intravenous, Propofol.   Maintenance: TIVA.        Consents    Anesthesia Plan(s) and associated risks, benefits, and realistic alternatives discussed. Questions answered and patient/representative(s) expressed understanding.     - Discussed: Risks, Benefits and Alternatives for BOTH SEDATION and the PROCEDURE were discussed     - Discussed with:  Patient      - Extended Intubation/Ventilatory Support Discussed: No.      - Patient is DNR/DNI Status: No    Use of blood products discussed: No .     Postoperative Care    Pain management: IV analgesics, Oral pain medications, Multi-modal analgesia, Peripheral nerve block (Single Shot).   PONV prophylaxis: Background Propofol Infusion     Comments:              PAC Discussion and Assessment    ASA Classification: 2  Case is suitable for: ASC                    PAC Resident/NP Anesthesia Assessment: Tonia Luo is a 47 y/o female who presents for pre-operative H&P in preparation for OPEN REDUCTION INTERNAL FIXATION, FRACTURE, WRIST with Piyush Lambert MD on 1/6/22 at [facility] and Surgery Center for treatment of Distal radius fracture    Pt has had prior anesthetic.  No history of anesthetic complications.      She has the following specific operative considerations:   # DAYLIN 1/8 = low risk  # VTE risk: 1.8%  # Risk of PONV score = 3.  If > 2, anti-emetic intervention recommended.  # Anesthesia considerations:  Refer to PAC assessment in anesthesia records    # Increased risk of postoperative nausea/vomiting: Recommend use of  antiemetic agents in the perioperative period.        CARDIAC: METS >4      # RCRI : No serious cardiac risks.  0.4% risk of major adverse cardiac event.       PULMONARY:     # Never smoked    # Denies asthma or inhaler use    GI:     # Denies GERD    ENDO: BMI 31    # No DM    HEME:     # On lovenox bid, will hold 12 hrs prior (prescribed at [facility] per pt). Will check INR on DOS.    # Hx PE one year ago, per pt    ORTHO:     # Acute right distal radius fracture        Patient is optimized and is acceptable candidate for the proposed procedure. No further diagnostic evaluation is needed.      ** Patient's visit was done virtually today.  A full physical exam was not completed.  Please refer to the physical examination documented by the anesthesiologist in the anesthesia record on the day of surgery. **    Final plan per anesthesiologist on day of surgery.     Reviewed and Signed by PAC Mid-Level Provider/Resident  Mid-Level Provider/Resident: Rita Pelletier PA-C  Date: 1/4/22  Time: 0800                               Rita Pelletier PA-C

## 2022-01-05 ENCOUNTER — APPOINTMENT (OUTPATIENT)
Dept: INTERPRETER SERVICES | Facility: CLINIC | Age: 47
End: 2022-01-05

## 2022-01-06 ENCOUNTER — APPOINTMENT (OUTPATIENT)
Dept: INTERPRETER SERVICES | Facility: CLINIC | Age: 47
End: 2022-01-06

## 2022-01-06 ENCOUNTER — HOSPITAL ENCOUNTER (OUTPATIENT)
Facility: AMBULATORY SURGERY CENTER | Age: 47
End: 2022-01-06
Attending: STUDENT IN AN ORGANIZED HEALTH CARE EDUCATION/TRAINING PROGRAM | Admitting: STUDENT IN AN ORGANIZED HEALTH CARE EDUCATION/TRAINING PROGRAM

## 2022-01-06 ENCOUNTER — ANESTHESIA (OUTPATIENT)
Dept: SURGERY | Facility: AMBULATORY SURGERY CENTER | Age: 47
End: 2022-01-06

## 2022-01-06 VITALS
RESPIRATION RATE: 16 BRPM | HEART RATE: 83 BPM | DIASTOLIC BLOOD PRESSURE: 74 MMHG | HEIGHT: 64 IN | TEMPERATURE: 98.6 F | OXYGEN SATURATION: 97 % | BODY MASS INDEX: 30.73 KG/M2 | SYSTOLIC BLOOD PRESSURE: 97 MMHG | WEIGHT: 180 LBS

## 2022-01-06 DIAGNOSIS — S52.571A OTHER CLOSED INTRA-ARTICULAR FRACTURE OF DISTAL END OF RIGHT RADIUS, INITIAL ENCOUNTER: Primary | ICD-10-CM

## 2022-01-06 LAB
ANION GAP SERPL CALCULATED.3IONS-SCNC: 7 MMOL/L (ref 3–14)
BUN SERPL-MCNC: 15 MG/DL (ref 7–30)
CALCIUM SERPL-MCNC: 8.5 MG/DL (ref 8.5–10.1)
CHLORIDE BLD-SCNC: 109 MMOL/L (ref 94–109)
CO2 SERPL-SCNC: 25 MMOL/L (ref 20–32)
CREAT SERPL-MCNC: 0.65 MG/DL (ref 0.52–1.04)
GFR SERPL CREATININE-BSD FRML MDRD: >90 ML/MIN/1.73M2
GLUCOSE BLD-MCNC: 94 MG/DL (ref 70–99)
HCG UR QL: NEGATIVE
HGB BLD-MCNC: 13.1 G/DL (ref 11.7–15.7)
INR PPP: 1.05 (ref 0.85–1.15)
INTERNAL QC OK POCT: NORMAL
POCT KIT EXPIRATION DATE: NORMAL
POCT KIT LOT NUMBER: NORMAL
POTASSIUM BLD-SCNC: 3.4 MMOL/L (ref 3.4–5.3)
SODIUM SERPL-SCNC: 141 MMOL/L (ref 133–144)

## 2022-01-06 PROCEDURE — 80048 BASIC METABOLIC PNL TOTAL CA: CPT | Performed by: PATHOLOGY

## 2022-01-06 PROCEDURE — C1713 ANCHOR/SCREW BN/BN,TIS/BN: HCPCS

## 2022-01-06 PROCEDURE — 25609 OPTX DST RD XART FX/EP SEP3+: CPT | Mod: RT | Performed by: STUDENT IN AN ORGANIZED HEALTH CARE EDUCATION/TRAINING PROGRAM

## 2022-01-06 PROCEDURE — 81025 URINE PREGNANCY TEST: CPT | Performed by: PATHOLOGY

## 2022-01-06 PROCEDURE — 85018 HEMOGLOBIN: CPT | Performed by: PATHOLOGY

## 2022-01-06 PROCEDURE — 85610 PROTHROMBIN TIME: CPT | Performed by: PATHOLOGY

## 2022-01-06 PROCEDURE — 25609 OPTX DST RD XART FX/EP SEP3+: CPT | Mod: RT

## 2022-01-06 PROCEDURE — 82962 GLUCOSE BLOOD TEST: CPT | Performed by: PATHOLOGY

## 2022-01-06 DEVICE — IMP WIRE KIRSCHNER 1.1MM 0.045X4" SGL END TROCAR KM71-132: Type: IMPLANTABLE DEVICE | Site: WRIST | Status: FUNCTIONAL

## 2022-01-06 DEVICE — IMPLANTABLE DEVICE: Type: IMPLANTABLE DEVICE | Site: WRIST | Status: FUNCTIONAL

## 2022-01-06 DEVICE — IMP WIRE KIRSCHNER 1.6MM 0.062X4" SGL END TROCAR KM71-134: Type: IMPLANTABLE DEVICE | Site: WRIST | Status: FUNCTIONAL

## 2022-01-06 RX ORDER — FENTANYL CITRATE 50 UG/ML
25-50 INJECTION, SOLUTION INTRAMUSCULAR; INTRAVENOUS
Status: DISCONTINUED | OUTPATIENT
Start: 2022-01-06 | End: 2022-01-06 | Stop reason: HOSPADM

## 2022-01-06 RX ORDER — SODIUM CHLORIDE, SODIUM LACTATE, POTASSIUM CHLORIDE, CALCIUM CHLORIDE 600; 310; 30; 20 MG/100ML; MG/100ML; MG/100ML; MG/100ML
INJECTION, SOLUTION INTRAVENOUS CONTINUOUS PRN
Status: DISCONTINUED | OUTPATIENT
Start: 2022-01-06 | End: 2022-01-06

## 2022-01-06 RX ORDER — PROPOFOL 10 MG/ML
INJECTION, EMULSION INTRAVENOUS CONTINUOUS PRN
Status: DISCONTINUED | OUTPATIENT
Start: 2022-01-06 | End: 2022-01-06

## 2022-01-06 RX ORDER — HYDROXYZINE HYDROCHLORIDE 25 MG/1
25 TABLET, FILM COATED ORAL
Status: DISCONTINUED | OUTPATIENT
Start: 2022-01-06 | End: 2022-01-07 | Stop reason: HOSPADM

## 2022-01-06 RX ORDER — BUPIVACAINE HYDROCHLORIDE 2.5 MG/ML
INJECTION, SOLUTION INFILTRATION; PERINEURAL PRN
Status: DISCONTINUED | OUTPATIENT
Start: 2022-01-06 | End: 2022-01-06 | Stop reason: HOSPADM

## 2022-01-06 RX ORDER — ONDANSETRON 4 MG/1
4-8 TABLET, ORALLY DISINTEGRATING ORAL EVERY 8 HOURS PRN
Qty: 4 TABLET | Refills: 0 | Status: SHIPPED | OUTPATIENT
Start: 2022-01-06

## 2022-01-06 RX ORDER — SENNOSIDES 8.6 MG
1 TABLET ORAL DAILY
Qty: 10 TABLET | Refills: 0 | Status: SHIPPED | OUTPATIENT
Start: 2022-01-06

## 2022-01-06 RX ORDER — OXYCODONE HYDROCHLORIDE 5 MG/1
5 TABLET ORAL
Status: DISCONTINUED | OUTPATIENT
Start: 2022-01-06 | End: 2022-01-07 | Stop reason: HOSPADM

## 2022-01-06 RX ORDER — NALOXONE HYDROCHLORIDE 0.4 MG/ML
0.2 INJECTION, SOLUTION INTRAMUSCULAR; INTRAVENOUS; SUBCUTANEOUS
Status: DISCONTINUED | OUTPATIENT
Start: 2022-01-06 | End: 2022-01-07 | Stop reason: HOSPADM

## 2022-01-06 RX ORDER — FLUMAZENIL 0.1 MG/ML
0.2 INJECTION, SOLUTION INTRAVENOUS
Status: DISCONTINUED | OUTPATIENT
Start: 2022-01-06 | End: 2022-01-06 | Stop reason: HOSPADM

## 2022-01-06 RX ORDER — ONDANSETRON 4 MG/1
4 TABLET, ORALLY DISINTEGRATING ORAL
Status: DISCONTINUED | OUTPATIENT
Start: 2022-01-06 | End: 2022-01-07 | Stop reason: HOSPADM

## 2022-01-06 RX ORDER — NALOXONE HYDROCHLORIDE 0.4 MG/ML
0.4 INJECTION, SOLUTION INTRAMUSCULAR; INTRAVENOUS; SUBCUTANEOUS
Status: DISCONTINUED | OUTPATIENT
Start: 2022-01-06 | End: 2022-01-07 | Stop reason: HOSPADM

## 2022-01-06 RX ORDER — IBUPROFEN 200 MG
600 TABLET ORAL
Status: DISCONTINUED | OUTPATIENT
Start: 2022-01-06 | End: 2022-01-07 | Stop reason: HOSPADM

## 2022-01-06 RX ORDER — PROPOFOL 10 MG/ML
INJECTION, EMULSION INTRAVENOUS PRN
Status: DISCONTINUED | OUTPATIENT
Start: 2022-01-06 | End: 2022-01-06

## 2022-01-06 RX ORDER — LIDOCAINE HYDROCHLORIDE 20 MG/ML
INJECTION, SOLUTION INFILTRATION; PERINEURAL PRN
Status: DISCONTINUED | OUTPATIENT
Start: 2022-01-06 | End: 2022-01-06

## 2022-01-06 RX ORDER — CEFAZOLIN SODIUM 2 G/50ML
2 SOLUTION INTRAVENOUS SEE ADMIN INSTRUCTIONS
Status: DISCONTINUED | OUTPATIENT
Start: 2022-01-06 | End: 2022-01-06 | Stop reason: HOSPADM

## 2022-01-06 RX ORDER — OXYCODONE HYDROCHLORIDE 5 MG/1
5 TABLET ORAL EVERY 6 HOURS PRN
Qty: 20 TABLET | Refills: 0 | Status: SHIPPED | OUTPATIENT
Start: 2022-01-06

## 2022-01-06 RX ORDER — BUPIVACAINE HYDROCHLORIDE 5 MG/ML
INJECTION, SOLUTION EPIDURAL; INTRACAUDAL
Status: COMPLETED | OUTPATIENT
Start: 2022-01-06 | End: 2022-01-06

## 2022-01-06 RX ORDER — ACETAMINOPHEN 500 MG
500-1000 TABLET ORAL EVERY 6 HOURS PRN
Qty: 50 TABLET | Refills: 1 | Status: SHIPPED | OUTPATIENT
Start: 2022-01-06

## 2022-01-06 RX ORDER — DOCUSATE SODIUM 100 MG/1
100 CAPSULE, LIQUID FILLED ORAL 2 TIMES DAILY
Qty: 30 CAPSULE | Refills: 1 | Status: SHIPPED | OUTPATIENT
Start: 2022-01-06

## 2022-01-06 RX ORDER — EPHEDRINE SULFATE 50 MG/ML
INJECTION, SOLUTION INTRAMUSCULAR; INTRAVENOUS; SUBCUTANEOUS PRN
Status: DISCONTINUED | OUTPATIENT
Start: 2022-01-06 | End: 2022-01-06

## 2022-01-06 RX ORDER — AMOXICILLIN 250 MG
1-2 CAPSULE ORAL 2 TIMES DAILY
Qty: 30 TABLET | Refills: 0 | Status: SHIPPED | OUTPATIENT
Start: 2022-01-06

## 2022-01-06 RX ORDER — CEFAZOLIN SODIUM 2 G/50ML
2 SOLUTION INTRAVENOUS
Status: COMPLETED | OUTPATIENT
Start: 2022-01-06 | End: 2022-01-06

## 2022-01-06 RX ADMIN — EPHEDRINE SULFATE 10 MG: 50 INJECTION, SOLUTION INTRAMUSCULAR; INTRAVENOUS; SUBCUTANEOUS at 09:48

## 2022-01-06 RX ADMIN — CEFAZOLIN SODIUM 2 G: 2 SOLUTION INTRAVENOUS at 09:37

## 2022-01-06 RX ADMIN — PROPOFOL 100 MCG/KG/MIN: 10 INJECTION, EMULSION INTRAVENOUS at 11:24

## 2022-01-06 RX ADMIN — PROPOFOL 50 MG: 10 INJECTION, EMULSION INTRAVENOUS at 10:38

## 2022-01-06 RX ADMIN — Medication 100 MCG: at 11:09

## 2022-01-06 RX ADMIN — LIDOCAINE HYDROCHLORIDE 60 MG: 20 INJECTION, SOLUTION INFILTRATION; PERINEURAL at 09:33

## 2022-01-06 RX ADMIN — SODIUM CHLORIDE, SODIUM LACTATE, POTASSIUM CHLORIDE, CALCIUM CHLORIDE: 600; 310; 30; 20 INJECTION, SOLUTION INTRAVENOUS at 11:27

## 2022-01-06 RX ADMIN — SODIUM CHLORIDE, SODIUM LACTATE, POTASSIUM CHLORIDE, CALCIUM CHLORIDE: 600; 310; 30; 20 INJECTION, SOLUTION INTRAVENOUS at 09:27

## 2022-01-06 RX ADMIN — PROPOFOL 250 MCG/KG/MIN: 10 INJECTION, EMULSION INTRAVENOUS at 09:33

## 2022-01-06 RX ADMIN — EPHEDRINE SULFATE 10 MG: 50 INJECTION, SOLUTION INTRAMUSCULAR; INTRAVENOUS; SUBCUTANEOUS at 10:15

## 2022-01-06 RX ADMIN — FENTANYL CITRATE 50 MCG: 50 INJECTION, SOLUTION INTRAMUSCULAR; INTRAVENOUS at 09:08

## 2022-01-06 RX ADMIN — EPHEDRINE SULFATE 5 MG: 50 INJECTION, SOLUTION INTRAMUSCULAR; INTRAVENOUS; SUBCUTANEOUS at 09:56

## 2022-01-06 RX ADMIN — BUPIVACAINE HYDROCHLORIDE 20 ML: 5 INJECTION, SOLUTION EPIDURAL; INTRACAUDAL at 09:11

## 2022-01-06 ASSESSMENT — MIFFLIN-ST. JEOR: SCORE: 1451.47

## 2022-01-06 NOTE — ANESTHESIA CARE TRANSFER NOTE
Patient: Tonia Luo    Procedure: Procedure(s):  OPEN REDUCTION INTERNAL FIXATION RIGHT WRIST FRACTURE       Diagnosis: Distal radius fracture [S52.509A]  Diagnosis Additional Information: No value filed.    Anesthesia Type:   No value filed.     Note:    Oropharynx: spontaneously breathing  Level of Consciousness: awake  Oxygen Supplementation: room air    Independent Airway: airway patency satisfactory and stable  Dentition: dentition unchanged  Vital Signs Stable: post-procedure vital signs reviewed and stable  Report to RN Given: handoff report given  Patient transferred to: Phase II  Comments: Resps easy and regular. Report to PACU RN  Handoff Report: Identifed the Patient, Identified the Reponsible Provider, Reviewed the pertinent medical history, Discussed the surgical course, Reviewed Intra-OP anesthesia mangement and issues during anesthesia, Set expectations for post-procedure period and Allowed opportunity for questions and acknowledgement of understanding      Vitals:  Vitals Value Taken Time   /69 01/06/22 1150   Temp 36.1  C (97  F) 01/06/22 1150   Pulse 83 01/06/22 1150   Resp 16 01/06/22 1150   SpO2 96 % 01/06/22 1150       Electronically Signed By: GELY MEYER CRNA  January 6, 2022  11:53 AM

## 2022-01-06 NOTE — ANESTHESIA POSTPROCEDURE EVALUATION
Patient: Tonia Luo    Procedure: Procedure(s):  OPEN REDUCTION INTERNAL FIXATION RIGHT WRIST FRACTURE       Diagnosis:Distal radius fracture [S52.509A]  Diagnosis Additional Information: No value filed.    Anesthesia Type:  MAC, Peripheral Nerve Block    Note:  Disposition: Outpatient   Postop Pain Control: Uneventful            Sign Out: Well controlled pain   PONV: No   Neuro/Psych: Uneventful            Sign Out: Acceptable/Baseline neuro status   Airway/Respiratory: Uneventful            Sign Out: Acceptable/Baseline resp. status   CV/Hemodynamics: Uneventful            Sign Out: Acceptable CV status; No obvious hypovolemia; No obvious fluid overload   Other NRE: NONE   DID A NON-ROUTINE EVENT OCCUR? No           Last vitals:  Vitals Value Taken Time   BP 97/74 01/06/22 1258   Temp 37  C (98.6  F) 01/06/22 1258   Pulse 93 01/06/22 1232   Resp 16 01/06/22 1258   SpO2 97 % 01/06/22 1258   Vitals shown include unvalidated device data.    Electronically Signed By: Fabio Palma MD  January 6, 2022  1:43 PM

## 2022-01-06 NOTE — DISCHARGE INSTRUCTIONS
Procedure Performed: Open reduction internal fixation right distal radius  Attending Surgeon: Piyush Lambert MD  Date: 1/6/2022    DIAGNOSIS  1. Other closed intra-articular fracture of distal end of right radius, initial encounter        MEDICATIONS   1. Resume all home medications as directed unless otherwise instructed during this hospitalization. IF THERE IS ANY QUESTION, double check with your doctors or your primary care provider.  2. Start new discharge medications as directed.    Take 1-2 tablets of extra strength Tylenol 500 mg every 6 hours.     For breakthrough pain use narcotic pain medication as prescribed.    3. Do not drive or operate machinery while taking narcotic pain medications.   4. If you are taking other Tylenol containing medicines at home, be sure NOT to exceed 4 gram's (4000 milligrams) of Tylenol per day.   5. If you are taking pain medications, be sure to take COLACE (docusate sodium) as well to prevent constipation. If constipated, try adding another cathartic or enema.  If nausea and vomiting, call the hospital or seek medical attention.    ACTIVITY   1. Weight bearing: Non-weight bearing to arm.    DIET  1. Resume same diet prior to your hospital admission.    WOUND   1. Leave splint on until you are seen in clinic for your follow up visit.   2. Watch for signs and symptoms of infection of your wounds including; pain, redness, swelling, drainage or fever.  If you notice any of these symptoms please seek medical attention.    3. Keep wound clean, dry and intact.  4. Do not submerge wounds in water until they are healed.     RETURN   1. Follow-up with Orthopaedic Clinic as directed.     ***  Future Appointments   Date Time Provider Department [place]   1/6/2022 11:35 AM VIDEO,  [facility]   1/24/2022 11:45 AM Piyush Lambert MD [facility]   1/24/2022 12:30 PM Ilda Vanessa OT [facility]       2. Call the [facility] at [phone] during business  hours for any symptoms such as:    * Fevers with Temperature greater than 101.5 degrees.   * Pus drainage from wound site.   * Severe pain, not controlled by medication.   * Persistent nausea, vomiting and inablility to tolerate fluids.    If you are receiving care in [place], you may call the Orthopaedic clinic at [phone] Option #2, Option #3.      -FOR URGENT PROBLEMS ONLY, after hours or on weekends call the hospital  at [phone] and ask to speak with the Orthopaedic resident on call.    3. You may call the Orthopaedic Clinic at [phone] with questions or concerns.    4. You may also be seen at our Orthopaedic Walk-In clinic that runs Monday through Friday from 7a-5p and Saturdays from 8a-12p at [address] [place] [id number]. You can call the Walk-In Clinic at [phone]        [facility]  Home Care Following Anesthesia  For 24 hours after surgery:  1. Get plenty of rest.  A responsible adult must stay with you for at least 24 hours after you leave the surgery center.  2. Do not drive or use heavy equipment.  If you have weakness or tingling, don't drive or use heavy equipment until this feeling goes away.   3. Do not drink alcohol.   4. Avoid strenuous or risky activities.  Ask for help when climbing stairs.  5. You may feel lightheaded.  IF so, sit for a few minutes before standing.  Have someone help you get up.   6. If you have nausea (feel sick to your stomach): Drink only clear liquids such as apple juice, ginger ale, broth or 7-Up.  Rest may also help.  Be sure to drink enough fluids.  Move to a regular diet as you feel able.   7. You may have a slight fever.  Call the doctor if your fever is over 100 F (37.7 C) (taken under the tongue) or lasts longer than 24 hours.  8. You may have a dry mouth, a sore throat, muscle aches or trouble sleeping. These should go away after 24 hours.  9. Do not make important or legal decisions.  "  10. It is recommended to avoid smoking.        Today you received an Exparel block to numb the nerves near your surgery site.  This is a block using local anesthetic or \"numbing\" medication injected around the nerves to anesthetize or \"numb\" the area supplied by those nerves.  This block is injected into the muscle layer near your surgical site.  This medication may numb the location where you had surgery up to 72 hours.  If your surgical site is an arm or leg you should be careful with your affected limb, since it is possible to injure your limb without being aware of it due to the numbing.  Until full feeling returns, you should guard against bumping or hitting your limb, and avoid extreme hot or cold temperatures on the skin.  As the block wears off, the feeling will return as a tingling or prickly sensation near your surgical site.  You will experince more discomfort from your incision as the feeling returns.  You may want to take a pain pill (a narcotic or Tylenol if this was prescribed by your surgeon) when you start to experience mild pain before the pain beomes more severe.  If your pain medications do not control your pain, you should notify your surgeon.    Tips for taking pain medications  To get the best pain relief possible, remember these points:    Take pain medications as directed, before pain becomes severe.    Pain medication can upset your stomach: taking it with food may help.    Constipation is a common side effect of pain medication. Drink plenty of  fluids.    Eat foods high in fiber. Take a stool softener if recommended by your doctor or pharmacist.    Do not drink alcohol, drive or operate machinery while taking pain medications.    Ask about other ways to control pain, such as with heat, ice or relaxation.    Tylenol/Acetaminophen Consumption  To help encourage the safe use of acetaminophen, the makers of TYLENOL  have lowered the maximum daily dose for single-ingredient Extra Strength " TYLENOL  (acetaminophen) products sold in the [place] from 8 pills per day (4,000 mg) to 6 pills per day (3,000 mg). The dosing interval has also changed from 2 pills every 4-6 hours to 2 pills every 6 hours.    If you feel your pain relief is insufficient, you may take Tylenol/Acetaminophen in addition to your narcotic pain medication.     Be careful not to exceed 3,000 mg of Tylenol/Acetaminophen in a 24 hour period from all sources.    If you are taking extra strength Tylenol/acetaminophen (500 mg), the maximum dose is 6 tablets in 24 hours.    If you are taking regular strength acetaminophen (325 mg), the maximum dose is 9 tablets in 24 hours.    Call a doctor for any of the followin. Signs of infection (fever, growing tenderness at the surgery site, a large amount of drainage or bleeding, severe pain, foul-smelling drainage, redness, swelling).  2. It has been over 8 to 10 hours since surgery and you are still not able to urinate (pass water).  3. Headache for over 24 hours.  4. Numbness, tingling or weakness the day after surgery (if you had spinal anesthesia).  5. Signs of Covid-19 infection (temperature over 100 degrees, shortness of breath, cough, loss of taste/smell, generalized body aches, persistent headache, chills, sore throat, nausea/vomiting/diarrhea)  Your doctor is:       Dr. Piyush Lambert, Orthopaedics: [phone]               Or dial [phone] and ask for the resident on call for:  Orthopaedics  For emergency care, call the:  [facility] Emergency Department: [phone] (TTY for hearing impaired: [phone])

## 2022-01-06 NOTE — ANESTHESIA PROCEDURE NOTES
Brachial plexus Procedure Note    Pre-Procedure   Staff -        Anesthesiologist:  Fabio Palma MD       Resident/Fellow: Diego Arroyo MD       Performed By: resident       Procedure Start/Stop Times: 1/6/2022 9:05 AM and 1/6/2022 9:11 AM       Pre-Anesthestic Checklist: patient identified, IV checked, site marked, risks and benefits discussed, informed consent, monitors and equipment checked, pre-op evaluation, at physician/surgeon's request and post-op pain management  Timeout:       Correct Patient: Yes        Correct Procedure: Yes        Correct Site: Yes        Correct Position: Yes        Correct Laterality: Yes        Site Marked: Yes  Procedure Documentation  Procedure: Brachial plexus       Laterality: right       Patient Position: sitting       Skin prep: Chloraprep (supraclavicular approach).       Needle Type: insulated       Needle Gauge: 21.        Needle Length (millimeters): 110        Ultrasound guided       1. Ultrasound was used to identify targeted nerve, plexus, vascular marker, or fascial plane and place a needle adjacent to it in real-time.       2. Ultrasound was used to visualize the spread of anesthetic in close proximity to the above referenced structure.       3. A permanent image is entered into the patient's record.    Assessment/Narrative         The placement was negative for: blood aspirated and painful injection       Paresthesias: No.     Bolus given via needle..        Secured via.        Insertion/Infusion Method: Single Shot       Complications: none    Medication(s) Administered   Bupivacaine 0.5% PF (Infiltration), 20 mL  Mepivacaine 2% PF (Perineural), 10 mL  Medication Administration Time: 1/6/2022 9:11 AM

## 2022-01-06 NOTE — OP NOTE
Patient: Tonia Luo  : 1975  MRN: [id number]    DATE OF OPERATION: 2022      OPERATIVE REPORT       PREOPERATIVE DIAGNOSIS:  Right intra-articular distal radius fracture     POSTOPERATIVE DIAGNOSIS:  Right intra-articular distal radius fracture     PROCEDURE:  Open reduction internal fixation right distal radius fracture, 3 fragments    SURGEON:  Piyush Lambert MD     ASSISTANT(S):  Carolina Wesley MD    ANESTHESIA:  Regional + MAC     IMPLANTS:   [employer] narrow distal radius plate    ESTIMATED BLOOD LOSS:  5cc    DVT PROPHYLAXIS:  SCDs    TOURNIQUET TIME:  95 minutes    SPECIMENS REMOVED:  None    INTRAOPERATIVE FINDINGS:  Dorsally displaced distal radius fracture with significant callus given chronicity    COMPLICATIONS:  None    DISPOSITION:  Stable to PACU.     INDICATIONS:  Ms. Tonia Luo is a 46 year old female who sustained a distal radius fracture after a mechanical ground-level fall after slipping on ice. She did not undergo a reduction. She presented to my clinic 2 weeks out from injury. Given the dorsal angulation, I recommended a reduction and either percutaneous pinning or open reduction internal fixation. The patient desired to maximize function in the early recovery period and elected to proceed with open reduction internal fixation.    Indications for surgery were discussed with the patient in detail with the help of a . After discussing risks, benefits and alternatives to procedure, the patient elected to proceed with surgical intervention.     The patient understood that risks include, but are not limited to: infection, bleeding, injury to nearby structures (such as nerves, blood vessels, and tendons), nonunion, malunion, hardware failure/irritation or breakage, need for additional surgery, pain, stiffness, scarring, need for rehabilitation, blood clots, pulmonary embolism, stroke, arrhythmia, myocardial infarction, death, and  anesthetic complications.  Patient expressed understanding and elected to proceed with surgery. All questions were answered to her satisfaction.    Consent was obtained for the procedure.     DESCRIPTION OF PROCEDURE IN DETAIL:  The patient was seen in the preoperative care unit. Patient identity, consent, procedure to be performed, and operative site were verified with the patient. The right upper extremity was marked.     The patient was brought to the operating room and placed supine on the operating room table. The right upper extremity was placed on an armboard. SCDs were placed on the bilateral lower extremities. A well-padded tourniquet was placed on the upper arm.     Monitored sedation was provided by the anesthesia team.     The right upper extremity was prepped and draped in the usual sterile fashion. A timeout was performed confirming the correct patient, procedure, operative site, and antibiotics. All were in agreement.    The right upper extremity was exsanguinated with Esmarch and the tourniquet was inflated to 250mm Hg.  A volar Wing approach was made to the distal radius.  A longitudinal incision over the FCR tendon extending 5cm proximal to the proximal wrist wrist crease was made. Careful hemostasis was obtained with bipolar cautery.    Blunt dissection was taken down to the level of FCR tendon. The volar sheath of the FCR was sharply divided.  The palmar cutaneous branch of the median nerve was identified and protected. The FCR tendon was retracted ulnarly and the radial artery was retracted radially. The dorsal sheath of the FCR was sharply incised revealing the FPL.  This was retracted ulnarly. The pronator quadratus was sharply elevated in an ulnar based flap maintaining its full thickness revealing the radius and fracture below. The fracture was cleaned carefully of callus.  A  reduction was manually obtained and checked under fluoroscopy. To assist reduction, the brachioradialis was  released in step wise fashion.    A volar locking plate was placed on the distal radius and temporarily held with k-wires.  Appropriate fracture reduction and plate placement were visualized under fluoroscopy.  The distal locking screws were drilled, measured and placed.     This was then repeated for the proximal screws. Appropriate fracture reduction and hardware placement was confirmed. Appropriate screw lengths were noted. No screws were within the radiocarpal or distal radioulnar joints. The provisional k-wires were removed. The wrist was taken through a range of motion.  It was noted that there was no crepitus.    The tourniquet was deflated and thorough irrigation was performed.  The pronator quadratus was repaired with 3-0 ethibond in running locking fashion. The brachioradialis was repaired with 3-0 ethibond figure of 8. Deep dermal tissue was repaired using 3-0 vicryl in an interrupted buried fashion.  Skin was closed with 4-0 monocryl in running fashion. Steri-strips were placed and a sterile surgical dressing was applied. The right upper extremity was immobilized in a well-padded short arm splint.    All needle and sponge counts were correct at the end of the procedure. There were no complications.     The patient was awoken from anesthesia and taken to the postoperative recovery unit in stable condition.     I was present and scrubbed for the entire procedure.     POSTOPERATIVE PLAN:  The patient will maintain the splint until the postoperative visit in 2 weeks. The splint will be kept clean and dry. The patient will remain non-weightbearing on the operative extremity. I have discussed and instructed the patient to keep the extremity elevated and work on range of motion exercises of the digits. She will be prescribed 20 tablets of oxycodone 5 mg q6h prn pain. X-rays will be obtained at the first postoperative visit out of plaster. She will see hand therapy at that time for wrist range of motion and a  brace.    Piyush Lambert MD     Hand, Upper Extremity & Microvascular Surgery  Department of Orthopedic Surgery  [facility]

## 2022-01-06 NOTE — OR NURSING
Pt received right sided supraclavicular nerve block without exparel. Pt received 1mg Versed and 50 mcg Fentanyl IV. Pt tolerated procedure without immediate complication. VSS.

## 2022-01-06 NOTE — BRIEF OP NOTE
"[facility]    Brief Operative Note    Pre-operative diagnosis: Distal radius fracture [S52.509A]  Post-operative diagnosis Same as pre-operative diagnosis    Procedure: Procedure(s):  OPEN REDUCTION INTERNAL FIXATION RIGHT WRIST FRACTURE  Surgeon: Surgeon(s) and Role:     * Piyush Lambert MD - Primary     * Carolina Wesley MD - Resident - Assisting     Anesthesia: Combined MAC with Supraclavicular Block   Estimated Blood Loss: Less than 10 ml    Drains: None  Specimens: * No specimens in log *  Findings:   See operative report for details.  Complications: None.  Implants:   Implant Name Type Inv. Item Serial No.  Lot No. LRB No. Used Action   IMP WIRE EVENS 1.1MM 0.045X4\" [facility] TROCAR [place] [id number] - [id number] Wire IMP WIRE EVENS 1.1MM 0.045X4\" [facility] TROCAR [id number]  [employer]  Right 4 Used as a Supply   IMP WIRE EVENS 1.6MM 0.062X4\" [facility] TROCAR [id number] - [id number] Wire IMP WIRE EVENS 1.6MM 0.062X4\" [facility] TROCAR [id number]  [employer]  Right 1 Used as a Supply   2.5 Adaptive I TriLock Distal Radius Plate, Right, Narrow    [employer]  Right 1 Implanted   2.5 Cortical Screw, HexaDrive 7, 13mm    [employer]  Right 1 Implanted   2.5 TriLock Screw, HexaDrive 7, 12mm    [employer]  Right 2 Implanted   2.5 TriLock Screw, HexaDrive 7, 16mm    [employer]  Right 4 Implanted   2.5 Cortical Screw, HexaDrive 7, 14mm    [employer]  Right 1 Implanted   2.5 Cortical Screw, HexaDrive 7, 12mm    [employer]  Right 2 Implanted     Post-Op Plan:  May discharge to home once PACU criteria met  Activity: Up ad fidencio  Weight bearing status: NWB RUE  Wound Care: Right short arm splint remain clean, dry and intact until clinic follow up   Pain management: Tylenol, ibuprofen, oxycodone  DVT prophylaxis: Ambulation  Follow up:    Future Appointments   Date Time Provider Department Center   1/24/2022 11:45 AM Piyush Lambert MD [facility]   1/24/2022 " 12:30 PM Ilda Vanessa, OT [facility]       Kennedi Wesley MD  Orthopaedic Surgery PGY-4

## 2022-01-07 LAB — GLUCOSE BLDC GLUCOMTR-MCNC: 85 MG/DL (ref 70–99)

## 2022-01-19 ENCOUNTER — APPOINTMENT (OUTPATIENT)
Dept: INTERPRETER SERVICES | Facility: CLINIC | Age: 47
End: 2022-01-19

## 2022-01-19 DIAGNOSIS — S52.501A CLOSED FRACTURE OF RIGHT DISTAL RADIUS: Primary | ICD-10-CM

## 2022-01-24 ENCOUNTER — OFFICE VISIT (OUTPATIENT)
Dept: ORTHOPEDICS | Facility: CLINIC | Age: 47
End: 2022-01-24

## 2022-01-24 ENCOUNTER — ANCILLARY PROCEDURE (OUTPATIENT)
Dept: GENERAL RADIOLOGY | Facility: CLINIC | Age: 47
End: 2022-01-24
Attending: STUDENT IN AN ORGANIZED HEALTH CARE EDUCATION/TRAINING PROGRAM

## 2022-01-24 ENCOUNTER — THERAPY VISIT (OUTPATIENT)
Dept: OCCUPATIONAL THERAPY | Facility: CLINIC | Age: 47
End: 2022-01-24

## 2022-01-24 DIAGNOSIS — S52.501A CLOSED FRACTURE OF RIGHT DISTAL RADIUS: ICD-10-CM

## 2022-01-24 DIAGNOSIS — S52.571D OTHER CLOSED INTRA-ARTICULAR FRACTURE OF DISTAL END OF RIGHT RADIUS WITH ROUTINE HEALING, SUBSEQUENT ENCOUNTER: Primary | ICD-10-CM

## 2022-01-24 DIAGNOSIS — M25.531 RIGHT WRIST PAIN: ICD-10-CM

## 2022-01-24 PROCEDURE — 99024 POSTOP FOLLOW-UP VISIT: CPT | Performed by: STUDENT IN AN ORGANIZED HEALTH CARE EDUCATION/TRAINING PROGRAM

## 2022-01-24 PROCEDURE — 97760 ORTHOTIC MGMT&TRAING 1ST ENC: CPT | Mod: GO | Performed by: OCCUPATIONAL THERAPIST

## 2022-01-24 PROCEDURE — 97110 THERAPEUTIC EXERCISES: CPT | Mod: GO | Performed by: OCCUPATIONAL THERAPIST

## 2022-01-24 PROCEDURE — 97165 OT EVAL LOW COMPLEX 30 MIN: CPT | Mod: GO | Performed by: OCCUPATIONAL THERAPIST

## 2022-01-24 PROCEDURE — 73110 X-RAY EXAM OF WRIST: CPT | Mod: RT | Performed by: RADIOLOGY

## 2022-01-24 NOTE — PROGRESS NOTES
"Hand Therapy Initial Evaluation    Current Date:  1/24/2022    Diagnosis: Fracture of distal end of right radius   DOI: ~12/20/21  DOS: 1/6/22  Procedure:  ORIF  Post:  2w 4d  MD notes:   - hand therapy today for zipper brace and to begin ROM  - counseled on importance of finger and wrist ROM  - NWJOE WOODY  - Ok to shower, no baths or soaking  - discontinue sling  F/u 4 weeks with xrays    Precautions: NWB RUE     Subjective:  Tonia Luo is a 46 year old female.    Patient reports symptoms of the right wrist  which occurred due to fall on ice. Since onset symptoms are Gradually getting better.  General health as reported by patient is good.    Pertinent medical history includes:  Past Medical History:   Diagnosis Date     Closed fracture of distal end of right radius 12/2021     Pulmonary embolism (H)        Medical allergies: No Known Allergies.      Surgical history: other:   Past Surgical History:   Procedure Laterality Date     ABDOMINAL WALL SURGERY  2018    \"tummy tuck\"     OPEN REDUCTION INTERNAL FIXATION WRIST Right 1/6/2022    Procedure: OPEN REDUCTION INTERNAL FIXATION RIGHT WRIST FRACTURE;  Surgeon: Piyush Lambert MD;  [place] [facility] [place]ication history:   Current Outpatient Medications   Medication     acetaminophen (TYLENOL) 500 MG tablet     docusate sodium (COLACE) 100 MG capsule     enoxaparin ANTICOAGULANT (LOVENOX) 80 MG/0.8ML syringe     ondansetron (ZOFRAN-ODT) 4 MG ODT tab     oxyCODONE (ROXICODONE) 5 MG tablet     senna-docusate (SENOKOT-S/PERICOLACE) 8.6-50 MG tablet     sennosides (SENOKOT) 8.6 MG tablet     No current facility-administered medications for this visit.     Current occupation is housekeeping, food packaging in company   Job Tasks: Lifting, Carrying, Pushing, Pulling, Repetitive Tasks    Occupational Profile Information:  Right hand dominant  Prior functional level:  no limitations  Patient reports symptoms of pain, stiffness/loss of motion, weakness/loss of " "strength, edema and numbness  Special tests:  x-ray.    Previous treatment: None  Barriers include:none  Mobility: No difficulty  Transportation: drives  Currently not working due to present treatment problem  Leisure activities/hobbies: Cooking, baking cakes     Functional Outcome Measure:   Upper Extremity Functional Index Score:  SCORE:   Column Totals: /80: 28   (A lower score indicates greater disability.)    Objective:  Pain Level (Scale 0-10)   1/24/2022   At Rest 1   With Use 5     Pain Description  Date 1/24/2022   Location Radial wrist and hand    Pain Quality Bump, shooting, sharp (then goes away)    Frequency constant     Pain is worst  nighttime   Exacerbated by  Movement    Relieved by Rest, positioning    Progression Getting better      Sensation    R RF dorsum numb at times.     Scar    Healing, covered in steri strips     Edema (Circumference measured in cm)   1/24/2022 1/24/2022    L R   DWC 16.5 17.5     Moderate edema and mild bruising noted in digits      Shoulder Screen  (Full or Limited)   1/24/2022 1/24/2022    L R   Reach behind head F \"   Reach contralateral shoulder \" \"   Reach low back \" \"   Reach mid back \" \"   Reach scapula \" \"     ROM  Pain Report:  -none + mild    ++ moderate    +++ severe   Elbow 1/24/2022 1/24/2022   AROM (PROM) L R   Extension WFL \"   Flexion \" \"     Wrist 1/24/2022 1/24/2022   AROM (PROM) L R   Extension 65 25+   Flexion 63 25+   RD 30 15   UD 40 20   Supination 85 80   Pronation 80 35     Distance from palm during composite flexion (According to digit O meter)   IF 5  MF 6  RF 5  SF 4    Thumb 1/24/2022 1/24/2022   AROM (PROM) L R   MP 0/55 0/40   IP 0/65 0/10   RABD 55 45   PABD 65 45     Palpation  Pain Report:  -none + mild    ++ moderate    +++ severe    1/24/2022   Radial Styloid -   DRSN -   TFCC -     Strength   (Measured in pounds)  NT d/t precautions     Assessment:  Patient presents with symptoms consistent with diagnosis of distal radius fracture, with " surgical  intervention.     Patient's limitations or Problem List includes:  Pain, Decreased ROM/motion, Increased edema, Weakness, Sensory disturbance, Hypomobility, Decreased , Decreased pinch, Decreased coordination, Decreased dexterity and Tightness in musculature of the right wrist, hand and thumb which interferes with the patient's ability to perform Self Care Tasks (dressing, bathing), Work Tasks, Sleep Patterns, Recreational Activities, Household Chores and Driving  as compared to previous level of function.    Rehab Potential:  Excellent - Return to full activity, no limitations     Patient will benefit from skilled Occupational Therapy to increase ROM, flexibility, motion, overall strength,  strength, pinch strength, stability of wrist, coordination, dexterity and sensation and decrease pain and edema to return to previous activity level and resume normal daily tasks and to reach their rehab potential.    Barriers to Learning:  Language    Communication Issues:  Patient appears to be able to clearly communicate and understand verbal and written communication and follow directions correctly.    Chart Review: Chart Review and Simple history review with patient    Identified Performance Deficits: bathing/showering, dressing, driving and community mobility, health management and maintenance, home establishment and management, meal preparation and cleanup, shopping, sleep, work and leisure activities    Assessment of Occupational Performance:  5 or more Performance Deficits    Clinical Decision Making (Complexity): Low complexity    Treatment Explanation:  The following has been discussed with the patient:  RX ordered/plan of care  Anticipated outcomes  Possible risks and side effects    Plan:  Frequency:  1 X week, once daily  Duration:  for 12 weeks    Treatment Plan:    Modalities:    US, Iontophoresis, Fluidotherapy, Paraffin, TENS and E-Stim   Therapeutic Exercise:    AROM, AAROM, PROM, Tendon  Gliding, Blocking, Reverse Blocking, Place and Hold, Contract Relax, Extensor Tracking, Isotonics, Isometrics and Stabilization  Therapeutic Activities:   Functional activities   Neuromuscular re-ed:   Nerve Gliding, Coordination/Dexterity, Sensory re-education, Proprioceptive Training, Posture, Kinesiotaping, Strain Counter Strain, Isometrics and Stabilization  Manual Techniques:   Coordination/Dexterity, Joint mobilization, Scar mobilization, Friction massage, Myofascial release and Manual edema mobilization  Orthotic Fabrication:    Static, Static progressive and Dynamic  Self Care:    Self Care Tasks, Ergonomic Considerations, Community Transportation and Work Tasks    Discharge Plan:    Achieve all LTG.  Independent in home treatment program.  Reach maximal therapeutic benefit.    Home Program:   R Zipper orthotic     PTRX Report  The following values have been sent to [facility].  Wrist Active Range of Motion Extension and Flexion Combined  EMR Notes  HEP - Sets  Reps 5-10  Sessions per day 2-3  Notes Hang wrist over edge of table  Active Range of Motion Combined Radial and Ulnar Deviation  EMR Notes  HEP - Sets  Reps 5-10  Sessions per day 2-3  Notes  Forearm Active Range of Motion Combined Pronation and Supination  EMR Notes  HEP - Sets  Reps 5-10  Sessions per day 2-3  Notes  Finger Active Range of Motion Tendon Glides Fist Series  EMR Notes  HEP - Sets  Reps 5-10  Sessions per day 2-3  Notes  Intrinsics Active Range of Motion Table Top Bending  EMR Notes  HEP - Sets  Reps 5-10  Sessions per day 2-3  Notes  Thumb Active Range of Motion Composite Flexion  EMR Notes  HEP - Sets  Reps 5-10  Sessions per day 2-3  Notes  Orthosis Wear and Care  EMR Notes  HEP - Sets  Reps  Sessions per day  Notes      Next Visit:  Assess HEP  Assess orthotic   Progress as able

## 2022-01-24 NOTE — NURSING NOTE
Reason For Visit:   Chief Complaint   Patient presents with     RECHECK     DOS 1/6/22 ORIF right distal radius fracture       Primary MD: Madeleine Vogel    Age: 46 year old    ?  No      LMP 01/05/2022 (Exact Date)       Pain Assessment  Patient Currently in Pain: Yes  0-10 Pain Scale: 1  Primary Pain Location: Wrist (Right)               QuickDASH Assessment  No flowsheet data found.       Current Outpatient Medications   Medication Sig Dispense Refill     acetaminophen (TYLENOL) 500 MG tablet Take 1-2 tablets (500-1,000 mg) by mouth every 6 hours as needed for mild pain 50 tablet 1     docusate sodium (COLACE) 100 MG capsule Take 1 capsule (100 mg) by mouth 2 times daily 30 capsule 1     enoxaparin ANTICOAGULANT (LOVENOX) 80 MG/0.8ML syringe Inject 80 mg Subcutaneous every 12 hours        ondansetron (ZOFRAN-ODT) 4 MG ODT tab Take 1-2 tablets (4-8 mg) by mouth every 8 hours as needed for nausea 4 tablet 0     oxyCODONE (ROXICODONE) 5 MG tablet Take 1 tablet (5 mg) by mouth every 6 hours as needed for severe pain 20 tablet 0     senna-docusate (SENOKOT-S/PERICOLACE) 8.6-50 MG tablet Take 1-2 tablets by mouth 2 times daily 30 tablet 0     sennosides (SENOKOT) 8.6 MG tablet Take 1 tablet by mouth daily 10 tablet 0       No Known Allergies    Jyoti Perez, ATC

## 2022-01-24 NOTE — LETTER
2022         RE: Tonia Luo  [address]  [place] [facility] [address]        Dear Colleague,    Thank you for referring your patient, Tonia Luo, to the [facility] [place]. Please see a copy of my visit note below.    Orthopaedic Surgery Hand Clinic Progress Note    Patient Name: Tonia Luo  MRN: [id number]  : 1975  Date: 2022    Date of Surgery: 22    Surgery Performed: ORIF Right distal radius fx    Subjective:  Ms. Tonia Luo is a 46 year old female who returns just over 2 weeks s/p above surgery. Doing well. No pain, no numbness/tingling. No fevers    Visit conducted with [place] telephone .    Objective:  LMP 2022 (Exact Date)     General: alert, appropriate, NAD  HEENT: NC/AT  CV: RRR by pulse  Pulm: Unlabored on RA  MSK:  - incision c/d/i, steris in place  - no erythema, drainage or e/o infection  - Wrist stiff, fingers mildly swollen and stiff, unable to make closed fist  - Intact EPL/FPL/APB/HI  - SILT m/r/u  WWP CR< 2s      Imaging:  XRs obtained today reviewed by me demonstrates anatomic reduction of distal radius fx with hardware in appropriate position    Assessment/Plan:  46 F s/p ORIF R distal radius fx 22. NVI, doing well    - hand therapy today for zipper brace and to begin ROM  - counseled on importance of finger and wrist ROM  - NWB RUE  - Ok to shower, no baths or soaking  - discontinue sling    F/u 4 weeks with xrays    Piyush Lambert MD    Hand, Upper Extremity & Microvascular Surgery  Department of Orthopedic Surgery  [facility]

## 2022-01-24 NOTE — PROGRESS NOTES
Orthopaedic Surgery Hand Clinic Progress Note    Patient Name: Tonia Luo  MRN: [id number]  : 1975  Date: 2022    Date of Surgery: 22    Surgery Performed: ORIF Right distal radius fx    Subjective:  Ms. Tonia Luo is a 46 year old female who returns just over 2 weeks s/p above surgery. Doing well. No pain, no numbness/tingling. No fevers    Visit conducted with [place] telephone .    Objective:  LMP 2022 (Exact Date)     General: alert, appropriate, NAD  HEENT: NC/AT  CV: RRR by pulse  Pulm: Unlabored on RA  MSK:  - incision c/d/i, steris in place  - no erythema, drainage or e/o infection  - Wrist stiff, fingers mildly swollen and stiff, unable to make closed fist  - Intact EPL/FPL/APB/HI  - SILT m/r/u  WWP CR< 2s      Imaging:  XRs obtained today reviewed by me demonstrates anatomic reduction of distal radius fx with hardware in appropriate position    Assessment/Plan:  46 F s/p ORIF R distal radius fx 22. NVI, doing well    - hand therapy today for zipper brace and to begin ROM  - counseled on importance of finger and wrist ROM  - NWB RUE  - Ok to shower, no baths or soaking  - discontinue sling    F/u 4 weeks with xrays    Piyush Lambert MD    Hand, Upper Extremity & Microvascular Surgery  Department of Orthopedic Surgery  [facility]

## 2022-01-31 ENCOUNTER — THERAPY VISIT (OUTPATIENT)
Dept: OCCUPATIONAL THERAPY | Facility: CLINIC | Age: 47
End: 2022-01-31

## 2022-01-31 DIAGNOSIS — M25.531 RIGHT WRIST PAIN: Primary | ICD-10-CM

## 2022-01-31 PROCEDURE — 97110 THERAPEUTIC EXERCISES: CPT | Mod: GO | Performed by: OCCUPATIONAL THERAPIST

## 2022-01-31 PROCEDURE — 97140 MANUAL THERAPY 1/> REGIONS: CPT | Mod: GO | Performed by: OCCUPATIONAL THERAPIST

## 2022-02-07 ENCOUNTER — THERAPY VISIT (OUTPATIENT)
Dept: OCCUPATIONAL THERAPY | Facility: CLINIC | Age: 47
End: 2022-02-07

## 2022-02-07 DIAGNOSIS — M25.531 RIGHT WRIST PAIN: ICD-10-CM

## 2022-02-07 PROCEDURE — 97140 MANUAL THERAPY 1/> REGIONS: CPT | Mod: GO | Performed by: OCCUPATIONAL THERAPIST

## 2022-02-07 PROCEDURE — 97110 THERAPEUTIC EXERCISES: CPT | Mod: GO | Performed by: OCCUPATIONAL THERAPIST

## 2022-02-14 ENCOUNTER — THERAPY VISIT (OUTPATIENT)
Dept: OCCUPATIONAL THERAPY | Facility: CLINIC | Age: 47
End: 2022-02-14

## 2022-02-14 DIAGNOSIS — M25.531 RIGHT WRIST PAIN: ICD-10-CM

## 2022-02-14 PROCEDURE — 99207 PR NO CHARGE LOS: CPT | Mod: GO | Performed by: OCCUPATIONAL THERAPIST

## 2022-02-14 NOTE — PROGRESS NOTES
2/14/22    No treatment was rendered, no charge.     Pt checked in to discuss application for black.

## 2022-02-15 ENCOUNTER — APPOINTMENT (OUTPATIENT)
Dept: INTERPRETER SERVICES | Facility: CLINIC | Age: 47
End: 2022-02-15

## 2022-02-16 DIAGNOSIS — S52.571D OTHER CLOSED INTRA-ARTICULAR FRACTURE OF DISTAL END OF RIGHT RADIUS WITH ROUTINE HEALING, SUBSEQUENT ENCOUNTER: Primary | ICD-10-CM

## 2022-02-21 ENCOUNTER — THERAPY VISIT (OUTPATIENT)
Dept: OCCUPATIONAL THERAPY | Facility: CLINIC | Age: 47
End: 2022-02-21

## 2022-02-21 ENCOUNTER — OFFICE VISIT (OUTPATIENT)
Dept: ORTHOPEDICS | Facility: CLINIC | Age: 47
End: 2022-02-21

## 2022-02-21 ENCOUNTER — ANCILLARY PROCEDURE (OUTPATIENT)
Dept: GENERAL RADIOLOGY | Facility: CLINIC | Age: 47
End: 2022-02-21
Attending: STUDENT IN AN ORGANIZED HEALTH CARE EDUCATION/TRAINING PROGRAM

## 2022-02-21 DIAGNOSIS — M25.531 RIGHT WRIST PAIN: ICD-10-CM

## 2022-02-21 DIAGNOSIS — S52.571D OTHER CLOSED INTRA-ARTICULAR FRACTURE OF DISTAL END OF RIGHT RADIUS WITH ROUTINE HEALING, SUBSEQUENT ENCOUNTER: ICD-10-CM

## 2022-02-21 DIAGNOSIS — Z98.890 POST-OPERATIVE STATE: Primary | ICD-10-CM

## 2022-02-21 PROCEDURE — 73110 X-RAY EXAM OF WRIST: CPT | Mod: RT | Performed by: RADIOLOGY

## 2022-02-21 PROCEDURE — 99024 POSTOP FOLLOW-UP VISIT: CPT | Performed by: PHYSICIAN ASSISTANT

## 2022-02-21 PROCEDURE — 97110 THERAPEUTIC EXERCISES: CPT | Mod: GO | Performed by: OCCUPATIONAL THERAPIST

## 2022-02-21 NOTE — PROGRESS NOTES
"SOAP note objective information for 2/21/2022.  Please refer to the daily flowsheet for treatment today, total treatment time and time spent performing 1:1 timed codes.       Per MD note 02/21/22  Plan:  The patient may wean out of the splint at this time. The patient may progress range of motion as tolerated with hand therapy and may begin strengthening exercises. No lifting greater than a gallon of milk for one month, then progress activity as tolerated. Avoid painful activities. Otherwise no activity restrictions. I will see the patient back for a postoperative visit in 6 weeks. Knows to contact us if any questions, concerns, or other issues arise.     Subjective: Pt reports good carryover of HEP. Application for black/financial assistance pending.   Objective: Wrist ROM measured today. Gentle hand strengthening, wrist PROM added to HEP.     Pain Level (Scale 0-10)   1/24/2022   At Rest 1   With Use 5     Pain Description  Date 1/24/2022   Location Radial wrist and hand    Pain Quality Bump, shooting, sharp (then goes away)    Frequency constant     Pain is worst  nighttime   Exacerbated by  Movement    Relieved by Rest, positioning    Progression Getting better      Sensation    R RF dorsum numb at times.     Scar    Healing, covered in steri strips     Edema (Circumference measured in cm)   1/24/2022 1/24/2022    L R   DWC 16.5 17.5     Moderate edema and mild bruising noted in digits      Shoulder Screen  (Full or Limited)   1/24/2022 1/24/2022    L R   Reach behind head F \"   Reach contralateral shoulder \" \"   Reach low back \" \"   Reach mid back \" \"   Reach scapula \" \"     ROM  Pain Report:  -none + mild    ++ moderate    +++ severe   Elbow 1/24/2022 1/24/2022   AROM (PROM) L R   Extension WFL \"   Flexion \" \"     Wrist 1/24/2022 1/24/2022 02/21/22     AROM (PROM) L R R   Extension 65 25+ 40   Flexion 63 25+ 25   RD 30 15 20   UD 40 20 30   Supination 85 80 55   Pronation 80 35 75     Distance from palm during " composite flexion (According to digit O meter)   IF 5  MF 6  RF 5  SF 4    Thumb 1/24/2022 1/24/2022   AROM (PROM) L R   MP 0/55 0/40   IP 0/65 0/10   RABD 55 45   PABD 65 45     Palpation  Pain Report:  -none + mild    ++ moderate    +++ severe    1/24/2022   Radial Styloid -   DRSN -   TFCC -     Strength   (Measured in pounds)  Cleared by MD to begin gentle strengthening -  and pinch deferred d/t precautions.

## 2022-02-21 NOTE — NURSING NOTE
Reason For Visit:   Chief Complaint   Patient presents with     Surgical Followup     6.5 wk pop, 4 wk FU, DOS 1/6/22 ORIF right wrist fx        Primary MD: Madeleine Vogel  Ref. MD: pao     Age: 46 year old    ?  Yes, specify language: Com[place]an      There were no vitals taken for this visit.      Pain Assessment  Patient Currently in Pain: No               QuickDASH Assessment  No flowsheet data found.       Current Outpatient Medications   Medication Sig Dispense Refill     acetaminophen (TYLENOL) 500 MG tablet Take 1-2 tablets (500-1,000 mg) by mouth every 6 hours as needed for mild pain 50 tablet 1     docusate sodium (COLACE) 100 MG capsule Take 1 capsule (100 mg) by mouth 2 times daily 30 capsule 1     enoxaparin ANTICOAGULANT (LOVENOX) 80 MG/0.8ML syringe Inject 80 mg Subcutaneous every 12 hours        ondansetron (ZOFRAN-ODT) 4 MG ODT tab Take 1-2 tablets (4-8 mg) by mouth every 8 hours as needed for nausea 4 tablet 0     oxyCODONE (ROXICODONE) 5 MG tablet Take 1 tablet (5 mg) by mouth every 6 hours as needed for severe pain 20 tablet 0     senna-docusate (SENOKOT-S/PERICOLACE) 8.6-50 MG tablet Take 1-2 tablets by mouth 2 times daily 30 tablet 0     sennosides (SENOKOT) 8.6 MG tablet Take 1 tablet by mouth daily 10 tablet 0       No Known Allergies    Nat Worthington, ATC

## 2022-02-21 NOTE — PROGRESS NOTES
Date of Service: Feb 21, 2022    Reason for visit: Postoperative follow-up    Date of Surgery: 1/6/22    Procedure Performed: Open reduction internal fixation  right distal radius fracture    Interval events: The patient comes to see me in follow-up today from the above surgery. The patient reports that their wrist range of motion has been improving. They've been doing their therapy exercises. Pain has been well managed. Wearing zipper splint as instructed.     Physical examination:  Well-developed, well-nourished and in no acute distress.  Alert and oriented to surroundings.  On examination of the wrist, incision is well-healed. There is no erythema, drainage, or dehiscence. Swelling is Mild. No distal sensory deficits noted. Patient can actively flex and extend all digits and thumb. The patient is able to make a full composite fist. Fingers are warm and well-perfused. AROM of the wrist is as follows: 25  extension, 50  flexion, 40  supination, 65  pronation.    Radiographs: Three views of the right wrist were obtained and reviewed. These demonstrate no changes in hardware or bony alignment.     Assessment: Progressing appropriately following the above procedure    Plan:  The patient may wean out of the splint at this time. The patient may progress range of motion as tolerated with hand therapy and may begin strengthening exercises. No lifting greater than a gallon of milk for one month, then progress activity as tolerated. Avoid painful activities. Otherwise no activity restrictions. The patient will follow up for postoperative visit in 6 weeks. Knows to contact us if any questions, concerns, or other issues arise.     SEBAS NAVARRETE PA-C  2/21/2022 10:18 AM   Orthopaedic Surgery

## 2022-02-21 NOTE — LETTER
2/21/2022         RE: Tonia Luo  [address]  [place] [facility] [address]        Dear Colleague,    Thank you for referring your patient, Tonia Luo, to the [facility] [place]. Please see a copy of my visit note below.    Date of Service: Feb 21, 2022    Reason for visit: Postoperative follow-up    Date of Surgery: 1/6/22    Procedure Performed: Open reduction internal fixation  right distal radius fracture    Interval events: The patient comes to see me in follow-up today from the above surgery. The patient reports that their wrist range of motion has been improving. They've been doing their therapy exercises. Pain has been well managed. Wearing zipper splint as instructed.     Physical examination:  Well-developed, well-nourished and in no acute distress.  Alert and oriented to surroundings.  On examination of the wrist, incision is well-healed. There is no erythema, drainage, or dehiscence. Swelling is Mild. No distal sensory deficits noted. Patient can actively flex and extend all digits and thumb. The patient is able to make a full composite fist. Fingers are warm and well-perfused. AROM of the wrist is as follows: 25  extension, 50  flexion, 40  supination, 65  pronation.    Radiographs: Three views of the right wrist were obtained and reviewed. These demonstrate no changes in hardware or bony alignment.     Assessment: Progressing appropriately following the above procedure    Plan:  The patient may wean out of the splint at this time. The patient may progress range of motion as tolerated with hand therapy and may begin strengthening exercises. No lifting greater than a gallon of milk for one month, then progress activity as tolerated. Avoid painful activities. Otherwise no activity restrictions. The patient will follow up for postoperative visit in 6 weeks. Knows to contact us if any questions, concerns, or other issues arise.     SEBAS  BI NAVARRETE  2/21/2022 10:18 AM   Orthopaedic Surgery

## 2022-03-07 ENCOUNTER — THERAPY VISIT (OUTPATIENT)
Dept: OCCUPATIONAL THERAPY | Facility: CLINIC | Age: 47
End: 2022-03-07

## 2022-03-07 DIAGNOSIS — M25.531 RIGHT WRIST PAIN: ICD-10-CM

## 2022-03-07 PROCEDURE — 97110 THERAPEUTIC EXERCISES: CPT | Mod: GO | Performed by: OCCUPATIONAL THERAPIST

## 2022-03-07 PROCEDURE — 97140 MANUAL THERAPY 1/> REGIONS: CPT | Mod: GO | Performed by: OCCUPATIONAL THERAPIST

## 2022-03-21 ENCOUNTER — THERAPY VISIT (OUTPATIENT)
Dept: OCCUPATIONAL THERAPY | Facility: CLINIC | Age: 47
End: 2022-03-21

## 2022-03-21 DIAGNOSIS — M25.531 RIGHT WRIST PAIN: Primary | ICD-10-CM

## 2022-03-21 PROCEDURE — 97110 THERAPEUTIC EXERCISES: CPT | Mod: GO | Performed by: OCCUPATIONAL THERAPIST

## 2022-03-21 NOTE — PROGRESS NOTES
" Discharge Note - Hand Therapy      Current Date:  3/21/2022    Initial Evaluation Date:  1/24/22  Reporting period is from 1/24/22 to 3/21/2022  Number of Visits:  6    Please refer to the daily flowsheet for treatment today, total treatment time and time spent performing 1:1 timed codes.     DOS: 1/6/22  Procedure:  ORIF  Post:  10w 4d    Subjective:  With the movement I have I'm doing it. But at times I have pain. At night my arm hurts, from the elbow to the shoulder, but that is getting better. I put on a cream and I go to sleep.  The surgery area was sore in the beginning but not since then.   Gradually increasing hours and activity at work. On a shorter shift.  IR behind the back is hard but I do it. Its a short pain and goes away.    Per eval: Current occupation is housekeeping, food packaging in company.    Objective:     Pain Level (Scale 0-10)   1/24/2022 3/21/2022     At Rest 1 0   With Use 5 2     Pain Description  Date 1/24/2022   Location Radial wrist and hand    Pain Quality Bump, shooting, sharp (then goes away)    Frequency constant     Pain is worst  nighttime   Exacerbated by  Movement    Relieved by Rest, positioning    Progression Getting better      Sensation    R RF dorsum numb at times.     Scar    Healing, covered in steri strips     Edema (Circumference measured in cm)   1/24/2022 1/24/2022    L R   DWC 16.5 17.5     Moderate edema and mild bruising noted in digits      Shoulder Screen  (Full or Limited)   1/24/2022 1/24/2022    L R   Reach behind head F \"   Reach contralateral shoulder \" \"   Reach low back \" \"   Reach mid back \" \"   Reach scapula \" \"     ROM  Pain Report:  -none + mild    ++ moderate    +++ severe   Elbow 1/24/2022 1/24/2022   AROM (PROM) L R   Extension WFL \"   Flexion \" \"     Wrist 1/24/2022 1/24/2022 02/21/22   3/21/2022     AROM (PROM) L R R    Extension 65 25+ 40 46   Flexion 63 25+ 25 21   RD 30 15 20 14   UD 40 20 30 44   Supination 85 80 55 64   Pronation 80 35 75 " 78     Pt able to make full fist, but not quite a full strong fist    Thumb 1/24/2022 1/24/2022 3/21/2022     AROM (PROM) L R R   MP 0/55 0/40    IP 0/65 0/10    RABD 55 45    PABD 65 45    opposition   WFL     Palpation  Pain Report:  -none + mild    ++ moderate    +++ severe    1/24/2022   Radial Styloid -   DRSN -   TFCC -     Assessment:  Response to therapy has been improvement to:  ROM of Wrist:  All Planes, as well as finger and thumb, and forearm motion.  Pt has made good progress, has excellent performance of her HEP at this time, including strengthening and ROM. She is not back to full motion or function, but this is expected at this timeframe in healing postoperatively. Anticipate pt will make further progress towards goals with continued home program performance. She is agreeable with this plan. It is the opinion of this writer that pt is appropriate to discharge and continue with her home program, gradually increasing her activity tolerance, gripping, and lifting abilities.    Appropriateness of Rx I have re-evaluated this patient and find that the nature, scope, duration and intensity of the therapy is appropriate for the medical condition of the patient.  Overall Assessment:  Patient is progressing well.  Patient is ready to be discharged from therapy and continue their home treatment program.  STG/LTG:  See goal sheet for details and updates.    Plan:  Frequency/Duration:  Discharge from Hand Therapy; continue home program.    Recommendations for Home Program - Therapeutic Exercise:  See ptrx

## 2022-03-25 DIAGNOSIS — Z98.890 POST-OPERATIVE STATE: Primary | ICD-10-CM

## 2022-03-25 DIAGNOSIS — S52.571D OTHER CLOSED INTRA-ARTICULAR FRACTURE OF DISTAL END OF RIGHT RADIUS WITH ROUTINE HEALING, SUBSEQUENT ENCOUNTER: ICD-10-CM

## 2022-03-28 ENCOUNTER — APPOINTMENT (OUTPATIENT)
Dept: INTERPRETER SERVICES | Facility: CLINIC | Age: 47
End: 2022-03-28

## 2022-04-04 ENCOUNTER — OFFICE VISIT (OUTPATIENT)
Dept: ORTHOPEDICS | Facility: CLINIC | Age: 47
End: 2022-04-04

## 2022-04-04 ENCOUNTER — ANCILLARY PROCEDURE (OUTPATIENT)
Dept: GENERAL RADIOLOGY | Facility: CLINIC | Age: 47
End: 2022-04-04
Attending: STUDENT IN AN ORGANIZED HEALTH CARE EDUCATION/TRAINING PROGRAM

## 2022-04-04 DIAGNOSIS — Z98.890 POST-OPERATIVE STATE: ICD-10-CM

## 2022-04-04 DIAGNOSIS — S52.571D OTHER CLOSED INTRA-ARTICULAR FRACTURE OF DISTAL END OF RIGHT RADIUS WITH ROUTINE HEALING, SUBSEQUENT ENCOUNTER: ICD-10-CM

## 2022-04-04 DIAGNOSIS — S52.571D OTHER CLOSED INTRA-ARTICULAR FRACTURE OF DISTAL END OF RIGHT RADIUS WITH ROUTINE HEALING, SUBSEQUENT ENCOUNTER: Primary | ICD-10-CM

## 2022-04-04 PROCEDURE — 73110 X-RAY EXAM OF WRIST: CPT | Mod: RT | Performed by: RADIOLOGY

## 2022-04-04 PROCEDURE — 99024 POSTOP FOLLOW-UP VISIT: CPT | Performed by: STUDENT IN AN ORGANIZED HEALTH CARE EDUCATION/TRAINING PROGRAM

## 2022-04-04 PROCEDURE — T1013 SIGN LANG/ORAL INTERPRETER: HCPCS | Mod: U3

## 2022-04-04 NOTE — LETTER
Return to Work  2022     Seen today: yes    Patient:  Tonia Luo  :   1975  MRN:     [id number]  Physician:    PIYUSH CH JULIA Janeth ALCIDES Annaleeyumiko Luo may return to work as tolerated.           Patient limitations:  Please allow gradual return to heavy lifting/pushing, please accommodate and allow light duty and breaks as needed  .               Electronically signed by Piyush Ch MD

## 2022-04-04 NOTE — PROGRESS NOTES
Orthopaedic Surgery [facility] Progress Note    Patient Name: Tonia Luo  MRN: [id number]  : 1975  Date: 2022    Date of Surgery: 22    Surgery Performed: ORIF R distal radius fx    Subjective:  Ms. Tonia Luo is a 46 year old female who returns now 3 months s/p above. Doing very well. No pain at the wrist. Still with some wrist stiffness. Using brace when she drives. Has returned to some light work. No numbness/tingling. No fevers. Has some proximal lateral forearm and shoulder soreness with activity.    Objective:  There were no vitals taken for this visit.    General: alert, appropriate, NAD  HEENT: NC/AT  CV: RRR by pulse  Pulm: Unlabored on RA  MSK:  - incision c/d/i, no erythema, drainage, or e/o infection  - WE 50  - WF 30  - supination 70  - pronation 80  - intact EPL/FPl/HI  - SILT m/r/u  - WWP CR< 2s    Imaging:  XRs today demonstrate healed distal radius fx with interval callus formation  Hardware in appropriate position    Reviewed by me    Assessment/Plan:    46F 3 months s/p ORIF R distal radius fx. NVI. ROM progressing appropriately    - ok to weight bear as tolerated, no restrictions  - continue hand therapy for ROM, strengthening  - patient seems to be compensating with elbow/shoulder due to wrist stiffness, likely cause of her soreness  - ok to return to work as tolerated, work note provided to allow for gradual return to heavy lifting/pushing  - discontinue zipper brace    Follow-up as needed. Patient voiced understanding and agreement.    Piyush Lambert MD    Hand, Upper Extremity & Microvascular Surgery  Department of Orthopedic Surgery  [facility]

## 2022-04-04 NOTE — NURSING NOTE
Reason For Visit:   Chief Complaint   Patient presents with     Surgical Followup     12.5 wk pop DOS 1/6/22 ORIF right wrist fx           Primary MD: Madeleine Vogel  Ref. MD: pao     Age: 46 year old    ?  Yes, specify language: Icelandic      There were no vitals taken for this visit.      Pain Assessment  Patient Currently in Pain: No               QuickDASH Assessment  No flowsheet data found.       Current Outpatient Medications   Medication Sig Dispense Refill     docusate sodium (COLACE) 100 MG capsule Take 1 capsule (100 mg) by mouth 2 times daily 30 capsule 1     enoxaparin ANTICOAGULANT (LOVENOX) 80 MG/0.8ML syringe Inject 80 mg Subcutaneous every 12 hours        ondansetron (ZOFRAN-ODT) 4 MG ODT tab Take 1-2 tablets (4-8 mg) by mouth every 8 hours as needed for nausea 4 tablet 0     acetaminophen (TYLENOL) 500 MG tablet Take 1-2 tablets (500-1,000 mg) by mouth every 6 hours as needed for mild pain (Patient not taking: Reported on 4/4/2022) 50 tablet 1     oxyCODONE (ROXICODONE) 5 MG tablet Take 1 tablet (5 mg) by mouth every 6 hours as needed for severe pain (Patient not taking: Reported on 4/4/2022) 20 tablet 0     senna-docusate (SENOKOT-S/PERICOLACE) 8.6-50 MG tablet Take 1-2 tablets by mouth 2 times daily (Patient not taking: Reported on 4/4/2022) 30 tablet 0     sennosides (SENOKOT) 8.6 MG tablet Take 1 tablet by mouth daily (Patient not taking: Reported on 4/4/2022) 10 tablet 0       No Known Allergies    Nat Worthington, ATC

## 2022-04-04 NOTE — LETTER
2022         RE: Tonia Luo  [address]  [address]  [facility] [id number]        Dear Colleague,    Thank you for referring your patient, Tonia Luo, to the [facility] [place]. Please see a copy of my visit note below.    Orthopaedic Surgery Hand Clinic Progress Note    Patient Name: Tonia Luo  MRN: [id number]  : 1975  Date: 2022    Date of Surgery: 22    Surgery Performed: ORIF R distal radius fx    Subjective:  Ms. Tonia Luo is a 46 year old female who returns now 3 months s/p above. Doing very well. No pain at the wrist. Still with some wrist stiffness. Using brace when she drives. Has returned to some light work. No numbness/tingling. No fevers. Has some proximal lateral forearm and shoulder soreness with activity.    Objective:  There were no vitals taken for this visit.    General: alert, appropriate, NAD  HEENT: NC/AT  CV: RRR by pulse  Pulm: Unlabored on RA  MSK:  - incision c/d/i, no erythema, drainage, or e/o infection  - WE 50  - WF 30  - supination 70  - pronation 80  - intact EPL/FPl/HI  - SILT m/r/u  - WWP CR< 2s    Imaging:  XRs today demonstrate healed distal radius fx with interval callus formation  Hardware in appropriate position    Reviewed by me    Assessment/Plan:    46F 3 months s/p ORIF R distal radius fx. NVI. ROM progressing appropriately    - ok to weight bear as tolerated, no restrictions  - continue hand therapy for ROM, strengthening  - patient seems to be compensating with elbow/shoulder due to wrist stiffness, likely cause of her soreness  - ok to return to work as tolerated, work note provided to allow for gradual return to heavy lifting/pushing  - discontinue zipper brace    Follow-up as needed. Patient voiced understanding and agreement.    Piyush Lambert MD    Hand, Upper Extremity & Microvascular Surgery  Department of Orthopedic  Surgery  [facility]

## (undated) DEVICE — DRSG GAUZE 4X4" TRAY 6939

## (undated) DEVICE — PREP CHLORHEXIDINE 4% 4OZ (HIBICLENS) 57504

## (undated) DEVICE — SU MONOCRYL 4-0 PS-2 27" UND Y426H

## (undated) DEVICE — TOURNIQUET SGL BLADDER 18"X4" RED 5921-218-135

## (undated) DEVICE — SLING ARM MED 79-99155

## (undated) DEVICE — SUCTION MANIFOLD NEPTUNE 2 SYS 1 PORT 702-025-000

## (undated) DEVICE — PREP SKIN SCRUB TRAY 4461A

## (undated) DEVICE — SOL NACL 0.9% IRRIG 500ML BOTTLE 2F7123

## (undated) DEVICE — DRSG STERI STRIP 1/2X4" R1547

## (undated) DEVICE — GOWN XLG DISP 9545

## (undated) DEVICE — PACK HAND CUSTOM ASC

## (undated) DEVICE — COVER CAMERA IN-LIGHT DISP LT-C02

## (undated) DEVICE — SU ETHIBOND 3-0 RB-1 30" X872H

## (undated) DEVICE — DRAPE C-ARM OEC MINI VIEW 6800   00-901917-01

## (undated) DEVICE — GLOVE PROTEXIS W/NEU-THERA 7.0  2D73TE70

## (undated) DEVICE — SU VICRYL 3-0 X-1 27" UND J458H

## (undated) DEVICE — DRSG ABDOMINAL 07 1/2X8" 7197D

## (undated) DEVICE — CAST PLASTER SPLINT 4X15" 7394

## (undated) DEVICE — SOL WATER IRRIG 500ML BOTTLE 2F7113

## (undated) DEVICE — GLOVE PROTEXIS BLUE W/NEU-THERA 7.5  2D73EB75

## (undated) DEVICE — CAST PADDING 4" WEBRIL STERILE 2847

## (undated) DEVICE — LINEN ORTHO PACK 5446

## (undated) RX ORDER — CEFAZOLIN SODIUM 1 G/3ML
INJECTION, POWDER, FOR SOLUTION INTRAMUSCULAR; INTRAVENOUS
Status: DISPENSED
Start: 2022-01-06

## (undated) RX ORDER — BUPIVACAINE HYDROCHLORIDE 2.5 MG/ML
INJECTION, SOLUTION EPIDURAL; INFILTRATION; INTRACAUDAL
Status: DISPENSED
Start: 2022-01-06

## (undated) RX ORDER — FENTANYL CITRATE 50 UG/ML
INJECTION, SOLUTION INTRAMUSCULAR; INTRAVENOUS
Status: DISPENSED
Start: 2022-01-06

## (undated) RX ORDER — PROPOFOL 10 MG/ML
INJECTION, EMULSION INTRAVENOUS
Status: DISPENSED
Start: 2022-01-06

## (undated) RX ORDER — EPHEDRINE SULFATE 50 MG/ML
INJECTION, SOLUTION INTRAMUSCULAR; INTRAVENOUS; SUBCUTANEOUS
Status: DISPENSED
Start: 2022-01-06

## (undated) RX ORDER — FENTANYL CITRATE-0.9 % NACL/PF 10 MCG/ML
PLASTIC BAG, INJECTION (ML) INTRAVENOUS
Status: DISPENSED
Start: 2022-01-06